# Patient Record
Sex: FEMALE | Race: WHITE | Employment: OTHER | URBAN - METROPOLITAN AREA
[De-identification: names, ages, dates, MRNs, and addresses within clinical notes are randomized per-mention and may not be internally consistent; named-entity substitution may affect disease eponyms.]

---

## 2017-01-03 ENCOUNTER — TELEPHONE (OUTPATIENT)
Dept: PHARMACY | Age: 67
End: 2017-01-03

## 2017-01-03 ENCOUNTER — OFFICE VISIT (OUTPATIENT)
Dept: ORTHOPEDIC SURGERY | Age: 67
End: 2017-01-03

## 2017-01-03 VITALS — HEIGHT: 66 IN | BODY MASS INDEX: 32.3 KG/M2 | WEIGHT: 201 LBS

## 2017-01-03 DIAGNOSIS — M75.111 INCOMPLETE TEAR OF RIGHT ROTATOR CUFF: Primary | ICD-10-CM

## 2017-01-03 PROCEDURE — 99213 OFFICE O/P EST LOW 20 MIN: CPT | Performed by: ORTHOPAEDIC SURGERY

## 2017-01-04 ENCOUNTER — TELEPHONE (OUTPATIENT)
Dept: CARDIOLOGY CLINIC | Age: 67
End: 2017-01-04

## 2017-01-05 ENCOUNTER — ANTI-COAG VISIT (OUTPATIENT)
Dept: PHARMACY | Age: 67
End: 2017-01-05

## 2017-01-05 DIAGNOSIS — I48.91 ATRIAL FIBRILLATION, UNSPECIFIED TYPE (HCC): ICD-10-CM

## 2017-01-05 LAB — INR BLD: 2.4

## 2017-01-08 ENCOUNTER — HOSPITAL ENCOUNTER (OUTPATIENT)
Dept: OTHER | Age: 67
Discharge: HOME | End: 2017-01-31
Attending: INTERNAL MEDICINE | Admitting: INTERNAL MEDICINE

## 2017-01-12 ENCOUNTER — HOSPITAL ENCOUNTER (OUTPATIENT)
Dept: SURGERY | Age: 67
Discharge: HOME | End: 2017-01-12
Attending: ORTHOPAEDIC SURGERY | Admitting: ORTHOPAEDIC SURGERY

## 2017-01-12 VITALS
HEIGHT: 67 IN | DIASTOLIC BLOOD PRESSURE: 87 MMHG | HEART RATE: 87 BPM | RESPIRATION RATE: 16 BRPM | WEIGHT: 193.5 LBS | OXYGEN SATURATION: 98 % | TEMPERATURE: 97.4 F | SYSTOLIC BLOOD PRESSURE: 140 MMHG | BODY MASS INDEX: 30.37 KG/M2

## 2017-01-12 LAB
APTT: 22.6 SEC (ref 25.2–36.4)
INR BLD: 1.01 (ref 0.85–1.16)
PROTHROMBIN TIME: 11.5 SEC (ref 9.8–13)

## 2017-01-12 RX ORDER — HYDROMORPHONE HCL 110MG/55ML
0.5 PATIENT CONTROLLED ANALGESIA SYRINGE INTRAVENOUS EVERY 5 MIN PRN
Status: DISCONTINUED | OUTPATIENT
Start: 2017-01-12 | End: 2017-01-13 | Stop reason: HOSPADM

## 2017-01-12 RX ORDER — SODIUM CHLORIDE 0.9 % (FLUSH) 0.9 %
10 SYRINGE (ML) INJECTION EVERY 12 HOURS SCHEDULED
Status: DISCONTINUED | OUTPATIENT
Start: 2017-01-12 | End: 2017-01-13 | Stop reason: HOSPADM

## 2017-01-12 RX ORDER — HYDROCODONE BITARTRATE AND ACETAMINOPHEN 5; 325 MG/1; MG/1
2 TABLET ORAL PRN
Status: ACTIVE | OUTPATIENT
Start: 2017-01-12 | End: 2017-01-12

## 2017-01-12 RX ORDER — FENTANYL CITRATE 50 UG/ML
25 INJECTION, SOLUTION INTRAMUSCULAR; INTRAVENOUS EVERY 5 MIN PRN
Status: DISCONTINUED | OUTPATIENT
Start: 2017-01-12 | End: 2017-01-13 | Stop reason: HOSPADM

## 2017-01-12 RX ORDER — OXYCODONE HYDROCHLORIDE AND ACETAMINOPHEN 5; 325 MG/1; MG/1
1-2 TABLET ORAL EVERY 4 HOURS PRN
Qty: 60 TABLET | Refills: 0 | Status: SHIPPED | OUTPATIENT
Start: 2017-01-12 | End: 2017-02-11

## 2017-01-12 RX ORDER — HYDROMORPHONE HCL 110MG/55ML
0.25 PATIENT CONTROLLED ANALGESIA SYRINGE INTRAVENOUS EVERY 5 MIN PRN
Status: DISCONTINUED | OUTPATIENT
Start: 2017-01-12 | End: 2017-01-13 | Stop reason: HOSPADM

## 2017-01-12 RX ORDER — SODIUM CHLORIDE, SODIUM LACTATE, POTASSIUM CHLORIDE, CALCIUM CHLORIDE 600; 310; 30; 20 MG/100ML; MG/100ML; MG/100ML; MG/100ML
INJECTION, SOLUTION INTRAVENOUS CONTINUOUS
Status: DISCONTINUED | OUTPATIENT
Start: 2017-01-12 | End: 2017-01-13 | Stop reason: HOSPADM

## 2017-01-12 RX ORDER — LIDOCAINE HYDROCHLORIDE 10 MG/ML
0.5 INJECTION, SOLUTION EPIDURAL; INFILTRATION; INTRACAUDAL; PERINEURAL ONCE
Status: DISCONTINUED | OUTPATIENT
Start: 2017-01-12 | End: 2017-01-13 | Stop reason: HOSPADM

## 2017-01-12 RX ORDER — HYDROCODONE BITARTRATE AND ACETAMINOPHEN 5; 325 MG/1; MG/1
1 TABLET ORAL PRN
Status: ACTIVE | OUTPATIENT
Start: 2017-01-12 | End: 2017-01-12

## 2017-01-12 RX ORDER — CLINDAMYCIN PHOSPHATE 900 MG/50ML
900 INJECTION INTRAVENOUS ONCE
Status: COMPLETED | OUTPATIENT
Start: 2017-01-12 | End: 2017-01-12

## 2017-01-12 RX ORDER — MEPERIDINE HYDROCHLORIDE 25 MG/ML
12.5 INJECTION INTRAMUSCULAR; INTRAVENOUS; SUBCUTANEOUS EVERY 5 MIN PRN
Status: DISCONTINUED | OUTPATIENT
Start: 2017-01-12 | End: 2017-01-13 | Stop reason: HOSPADM

## 2017-01-12 RX ORDER — LIDOCAINE HYDROCHLORIDE 10 MG/ML
1 INJECTION, SOLUTION EPIDURAL; INFILTRATION; INTRACAUDAL; PERINEURAL
Status: ACTIVE | OUTPATIENT
Start: 2017-01-12 | End: 2017-01-12

## 2017-01-12 RX ORDER — SODIUM CHLORIDE 0.9 % (FLUSH) 0.9 %
10 SYRINGE (ML) INJECTION PRN
Status: DISCONTINUED | OUTPATIENT
Start: 2017-01-12 | End: 2017-01-13 | Stop reason: HOSPADM

## 2017-01-12 RX ORDER — DIPHENHYDRAMINE HYDROCHLORIDE 50 MG/ML
12.5 INJECTION INTRAMUSCULAR; INTRAVENOUS
Status: ACTIVE | OUTPATIENT
Start: 2017-01-12 | End: 2017-01-12

## 2017-01-12 RX ORDER — CEFAZOLIN SODIUM 2 G/100ML
2 INJECTION, SOLUTION INTRAVENOUS
Status: COMPLETED | OUTPATIENT
Start: 2017-01-12 | End: 2017-01-12

## 2017-01-12 RX ORDER — PROMETHAZINE HYDROCHLORIDE 25 MG/ML
6.25 INJECTION, SOLUTION INTRAMUSCULAR; INTRAVENOUS EVERY 30 MIN PRN
Status: DISCONTINUED | OUTPATIENT
Start: 2017-01-12 | End: 2017-01-13 | Stop reason: HOSPADM

## 2017-01-12 RX ORDER — FENTANYL CITRATE 50 UG/ML
50 INJECTION, SOLUTION INTRAMUSCULAR; INTRAVENOUS EVERY 5 MIN PRN
Status: DISCONTINUED | OUTPATIENT
Start: 2017-01-12 | End: 2017-01-13 | Stop reason: HOSPADM

## 2017-01-12 RX ADMIN — CLINDAMYCIN PHOSPHATE 900 MG: 900 INJECTION INTRAVENOUS at 14:22

## 2017-01-12 RX ADMIN — CEFAZOLIN SODIUM 2 G: 2 INJECTION, SOLUTION INTRAVENOUS at 16:44

## 2017-01-12 ASSESSMENT — PAIN SCALES - GENERAL
PAINLEVEL_OUTOF10: 0

## 2017-01-12 ASSESSMENT — PAIN - FUNCTIONAL ASSESSMENT: PAIN_FUNCTIONAL_ASSESSMENT: 0-10

## 2017-01-13 DIAGNOSIS — M75.111 INCOMPLETE ROTATOR CUFF TEAR OR RUPTURE OF RIGHT SHOULDER, NOT SPECIFIED AS TRAUMATIC: Primary | ICD-10-CM

## 2017-01-13 DIAGNOSIS — M75.41 IMPINGEMENT SYNDROME, SHOULDER, RIGHT: ICD-10-CM

## 2017-01-13 DIAGNOSIS — M19.019 ARTHRITIS, SHOULDER REGION: ICD-10-CM

## 2017-01-17 ENCOUNTER — OFFICE VISIT (OUTPATIENT)
Dept: ORTHOPEDIC SURGERY | Age: 67
End: 2017-01-17

## 2017-01-17 DIAGNOSIS — M75.111 INCOMPLETE TEAR OF RIGHT ROTATOR CUFF: Primary | ICD-10-CM

## 2017-01-17 PROCEDURE — 99024 POSTOP FOLLOW-UP VISIT: CPT | Performed by: ORTHOPAEDIC SURGERY

## 2017-01-23 ENCOUNTER — ANTI-COAG VISIT (OUTPATIENT)
Dept: PHARMACY | Age: 67
End: 2017-01-23

## 2017-01-23 DIAGNOSIS — I48.91 ATRIAL FIBRILLATION, UNSPECIFIED TYPE (HCC): ICD-10-CM

## 2017-01-23 LAB — INR BLD: 2

## 2017-01-24 DIAGNOSIS — M25.521 RIGHT ELBOW PAIN: Primary | ICD-10-CM

## 2017-01-25 RX ORDER — METHYLPREDNISOLONE 4 MG/1
TABLET ORAL
Qty: 1 KIT | Refills: 0 | OUTPATIENT
Start: 2017-01-25 | End: 2017-01-30

## 2017-02-06 ENCOUNTER — ANTI-COAG VISIT (OUTPATIENT)
Dept: PHARMACY | Age: 67
End: 2017-02-06

## 2017-02-06 DIAGNOSIS — I48.91 ATRIAL FIBRILLATION, UNSPECIFIED TYPE (HCC): ICD-10-CM

## 2017-02-06 LAB — INR BLD: 3.2

## 2017-02-20 ENCOUNTER — ANTI-COAG VISIT (OUTPATIENT)
Dept: PHARMACY | Age: 67
End: 2017-02-20

## 2017-02-20 DIAGNOSIS — I48.91 ATRIAL FIBRILLATION, UNSPECIFIED TYPE (HCC): ICD-10-CM

## 2017-02-20 LAB — INR BLD: 2.6

## 2017-02-21 ENCOUNTER — OFFICE VISIT (OUTPATIENT)
Dept: ORTHOPEDIC SURGERY | Age: 67
End: 2017-02-21

## 2017-02-21 VITALS — HEIGHT: 67 IN | WEIGHT: 193 LBS | BODY MASS INDEX: 30.29 KG/M2

## 2017-02-21 DIAGNOSIS — M75.111 INCOMPLETE TEAR OF RIGHT ROTATOR CUFF: Primary | ICD-10-CM

## 2017-02-21 PROCEDURE — 99024 POSTOP FOLLOW-UP VISIT: CPT | Performed by: ORTHOPAEDIC SURGERY

## 2017-03-01 ENCOUNTER — HOSPITAL ENCOUNTER (OUTPATIENT)
Dept: OTHER | Age: 67
Discharge: HOME | End: 2017-03-31
Attending: ORTHOPAEDIC SURGERY | Admitting: ORTHOPAEDIC SURGERY

## 2017-03-20 ENCOUNTER — ANTI-COAG VISIT (OUTPATIENT)
Dept: PHARMACY | Age: 67
End: 2017-03-20

## 2017-03-20 DIAGNOSIS — I48.91 ATRIAL FIBRILLATION, UNSPECIFIED TYPE (HCC): ICD-10-CM

## 2017-03-20 LAB — INR BLD: 2.1

## 2017-04-04 ENCOUNTER — OFFICE VISIT (OUTPATIENT)
Dept: ORTHOPEDIC SURGERY | Age: 67
End: 2017-04-04

## 2017-04-04 VITALS — HEIGHT: 67 IN | WEIGHT: 193 LBS | BODY MASS INDEX: 30.29 KG/M2

## 2017-04-04 DIAGNOSIS — M75.111 INCOMPLETE TEAR OF RIGHT ROTATOR CUFF: Primary | ICD-10-CM

## 2017-04-04 PROCEDURE — 99024 POSTOP FOLLOW-UP VISIT: CPT | Performed by: ORTHOPAEDIC SURGERY

## 2017-04-17 ENCOUNTER — ANTI-COAG VISIT (OUTPATIENT)
Dept: PHARMACY | Age: 67
End: 2017-04-17

## 2017-04-17 DIAGNOSIS — I48.91 ATRIAL FIBRILLATION, UNSPECIFIED TYPE (HCC): ICD-10-CM

## 2017-04-17 LAB — INR BLD: 2.6

## 2017-05-26 ENCOUNTER — TELEPHONE (OUTPATIENT)
Dept: PHARMACY | Age: 67
End: 2017-05-26

## 2017-05-30 ENCOUNTER — ANTI-COAG VISIT (OUTPATIENT)
Dept: PHARMACY | Age: 67
End: 2017-05-30

## 2017-05-30 DIAGNOSIS — I48.91 ATRIAL FIBRILLATION, UNSPECIFIED TYPE (HCC): ICD-10-CM

## 2017-05-30 LAB — INR BLD: 1.7

## 2017-06-16 ENCOUNTER — OFFICE VISIT (OUTPATIENT)
Dept: CARDIOLOGY CLINIC | Age: 67
End: 2017-06-16

## 2017-06-16 VITALS
WEIGHT: 191.8 LBS | OXYGEN SATURATION: 92 % | HEIGHT: 67 IN | SYSTOLIC BLOOD PRESSURE: 130 MMHG | BODY MASS INDEX: 30.1 KG/M2 | HEART RATE: 86 BPM | DIASTOLIC BLOOD PRESSURE: 72 MMHG

## 2017-06-16 DIAGNOSIS — I10 ESSENTIAL HYPERTENSION: Chronic | ICD-10-CM

## 2017-06-16 DIAGNOSIS — I48.91 ATRIAL FIBRILLATION, UNSPECIFIED TYPE (HCC): Primary | ICD-10-CM

## 2017-06-16 DIAGNOSIS — E78.00 HYPERCHOLESTEROLEMIA: Chronic | ICD-10-CM

## 2017-06-16 DIAGNOSIS — I34.0 MITRAL VALVE INSUFFICIENCY, UNSPECIFIED ETIOLOGY: ICD-10-CM

## 2017-06-16 PROCEDURE — G8399 PT W/DXA RESULTS DOCUMENT: HCPCS | Performed by: INTERNAL MEDICINE

## 2017-06-16 PROCEDURE — 93000 ELECTROCARDIOGRAM COMPLETE: CPT | Performed by: INTERNAL MEDICINE

## 2017-06-16 PROCEDURE — 1090F PRES/ABSN URINE INCON ASSESS: CPT | Performed by: INTERNAL MEDICINE

## 2017-06-16 PROCEDURE — 3017F COLORECTAL CA SCREEN DOC REV: CPT | Performed by: INTERNAL MEDICINE

## 2017-06-16 PROCEDURE — 3014F SCREEN MAMMO DOC REV: CPT | Performed by: INTERNAL MEDICINE

## 2017-06-16 PROCEDURE — 4040F PNEUMOC VAC/ADMIN/RCVD: CPT | Performed by: INTERNAL MEDICINE

## 2017-06-16 PROCEDURE — 1123F ACP DISCUSS/DSCN MKR DOCD: CPT | Performed by: INTERNAL MEDICINE

## 2017-06-16 PROCEDURE — G8427 DOCREV CUR MEDS BY ELIG CLIN: HCPCS | Performed by: INTERNAL MEDICINE

## 2017-06-16 PROCEDURE — 99213 OFFICE O/P EST LOW 20 MIN: CPT | Performed by: INTERNAL MEDICINE

## 2017-06-16 PROCEDURE — 1036F TOBACCO NON-USER: CPT | Performed by: INTERNAL MEDICINE

## 2017-06-16 PROCEDURE — G8417 CALC BMI ABV UP PARAM F/U: HCPCS | Performed by: INTERNAL MEDICINE

## 2017-06-16 RX ORDER — CELECOXIB 100 MG/1
100 CAPSULE ORAL DAILY
COMMUNITY
End: 2017-12-21 | Stop reason: ALTCHOICE

## 2017-07-10 ENCOUNTER — ANTI-COAG VISIT (OUTPATIENT)
Dept: PHARMACY | Age: 67
End: 2017-07-10

## 2017-07-10 DIAGNOSIS — I48.91 ATRIAL FIBRILLATION, UNSPECIFIED TYPE (HCC): ICD-10-CM

## 2017-07-10 LAB — INR BLD: 2.6

## 2017-08-21 ENCOUNTER — ANTI-COAG VISIT (OUTPATIENT)
Dept: PHARMACY | Age: 67
End: 2017-08-21

## 2017-08-21 DIAGNOSIS — I48.91 ATRIAL FIBRILLATION, UNSPECIFIED TYPE (HCC): ICD-10-CM

## 2017-08-21 LAB — INR BLD: 2

## 2017-10-02 ENCOUNTER — ANTI-COAG VISIT (OUTPATIENT)
Dept: PHARMACY | Age: 67
End: 2017-10-02

## 2017-10-02 DIAGNOSIS — I48.91 ATRIAL FIBRILLATION, UNSPECIFIED TYPE (HCC): ICD-10-CM

## 2017-10-02 LAB
INR BLD: 1.9
PROTIME: 23 SECONDS

## 2017-10-02 NOTE — MR AVS SNAPSHOT
After Visit Summary             Bessie Schuster   10/2/2017 9:00 AM   Anti-coag visit    Description:  Female : 1950   Provider:  KIANA Jett [facility]   Department:  [address]              Your Follow-Up and Future Appointments         Below is a list of your follow-up and future appointments. This may not be a complete list as you may have made appointments directly with providers that we are not aware of or your providers may have made some for you. Please call your providers to confirm appointments. It is important to keep your appointments. Please bring your current insurance card, photo ID, co-pay, and all medication bottles to your appointment. If self-pay, payment is expected at the time of service. Your To-Do List     Future Appointments Provider Department Dept Phone    10/13/2017 7:30 AM Keyur Sheikh MD [facility] [phone]    Please arrive 15 minutes prior to appointment, bring photo ID and insurance card. 2017 9:00 AM [facility] [phone]         Information from Your Visit        Department     Name Address Phone Fax    [address] [address] [place] [address] [phone]      You Were Seen for:         Comments    Atrial fibrillation, unspecified type [facility])   [[id number]]         Anticoagulation Summary as of 10/2/2017              Today's INR 1. 9! Next INR check 2017      Description           Take 2 mg today then continue dose of 1mg on Mon & Fri and 2mg all other days. Vital Signs     Smoking Status                   Former Smoker           Additional Information about your Body Mass Index (BMI)           Your BMI as listed above is considered obese (30 or more). BMI is an estimate of body fat, calculated from your height and weight.   The higher your BMI, the greater your risk of heart disease, high blood pressure, type 2 diabetes, stroke, gallstones, arthritis, sleep apnea, and certain cancers. BMI is not perfect. It may overestimate body fat in athletes and people who are more muscular. Even a small weight loss (between 5 and 10 percent of your current weight) by decreasing your calorie intake and becoming more physically active will help lower your risk of developing or worsening diseases associated with obesity. Learn more at: [email or web address]             Today's Medication Changes          These changes are accurate as of: 10/2/17  9:11 AM.  If you have any questions, ask your nurse or doctor. CHANGE how you take these medications           * warfarin 2 MG tablet   Commonly known as:  COUMADIN   Instructions: Take 2 mg /day but on Sun and WED take 1 mg  Rechecked in 7-10 days   Quantity:  30 tablet   Refills:  3   What changed:    - how much to take  - when to take this  - additional instructions       * warfarin 1 MG tablet   Commonly known as:  COUMADIN   Instructions: Take 1 mg by mouth Twice a Week Take on mondays and fridays   Refills:  0   What changed:  Another medication with the same name was changed. Make sure you understand how and when to take each. * Notice: This list has 2 medication(s) that are the same as other medications prescribed for you. Read the directions carefully, and ask your doctor or other care provider to review them with you.             Your Current Medications Are              celecoxib (CELEBREX) 100 MG capsule Take 100 mg by mouth daily    lisinopril-hydrochlorothiazide (PRINZIDE;ZESTORETIC) 10-12.5 MG per tablet 1 tablet daily     warfarin (COUMADIN) 2 MG tablet Take 2 mg /day but on Sun and WED take 1 mg  Rechecked in 7-10 days    warfarin (COUMADIN) 1 MG tablet Take 1 mg by mouth Twice a Week Take on mondays and fridays levothyroxine (SYNTHROID) 125 MCG tablet Take 125 mcg by mouth Daily. pravastatin (PRAVACHOL) 40 MG tablet Take 40 mg by mouth daily. Cholecalciferol (VITAMIN D3) 2000 UNITS CAPS Take 1 capsule by mouth daily. Allergies              Pcn [Penicillins] Hives      We Ordered/Performed the following           Protime-INR     Comments: This external order was created through the results console. Result Summary for Protime-INR      Result Information     Status          Final result (Resulted: 10/2/2017  9:08 AM)           10/2/2017  9:09 AM      Component Results     Component Value Ref Range & Units Status    INR 1.9  Final    Protime 23 seconds Final               Additional Information        Basic Information     Date Of Birth Sex Race Ethnicity Preferred Language    1950 Female White Non-/Non  English      Problem List as of 10/2/2017  Date Reviewed: 6/16/2017                Mitral regurgitation    Incomplete tear of right rotator cuff    Shoulder impingement    Diverticulitis of colon    Essential hypertension (Chronic)    Hypercholesterolemia (Chronic)    Atrial fibrillation (HCC)      Preventive Care        Date Due    Hepatitis C screening is recommended for all adults regardless of risk factors born between [facility] at least once (lifetime) who have never been tested. 1950    Tetanus Combination Vaccine (1 - Tdap) 3/15/1967    Colonoscopy 3/15/1998    Zoster Vaccine 3/15/2008    Pneumococcal Vaccines (two) for all adults aged 72 and over (1 of 2 - PCV13) 3/15/2013    Cholesterol Screening 12/12/2014    Yearly Flu Vaccine (1) 9/1/2017    Mammograms are recommended every 2 years for low/average risk patients aged 48 - 69, and every year for high risk patients per updated national guidelines. However these guidelines can be individualized by your provider.  11/22/2018            Rigoberto Signup 13      1 mg         14      2 mg           15      2 mg         16      1 mg         17      2 mg         18      2 mg         19      2 mg         20      1 mg         21      2 mg           22      2 mg         23      1 mg         24      2 mg         25      2 mg         26      2 mg         27      1 mg         28      2 mg           29      2 mg         30      1 mg         31      2 mg              Date Details   10/02 This INR check               How to take your warfarin dose              To take:  1 mg Take 0.5 of a 2 mg tablet. To take:  2 mg Take 1 of the 2 mg tablets. November 2017 Details    Sun Mon Tue Wed Thu Fri Sat        1      2 mg         2      2 mg         3      1 mg         4      2 mg           5      2 mg         6      1 mg         7      2 mg         8      2 mg         9      2 mg         10      1 mg         11      2 mg           12      2 mg         13      1 mg         14               15               16               17               18                 19               20               21               22               23               24               25                 26               32               28               29               30                  Date Details   No additional details    Date of next INR:  11/13/2017         How to take your warfarin dose              To take:  1 mg Take 0.5 of a 2 mg tablet. To take:  2 mg Take 1 of the 2 mg tablets.

## 2017-10-13 ENCOUNTER — OFFICE VISIT (OUTPATIENT)
Dept: ORTHOPEDIC SURGERY | Age: 67
End: 2017-10-13

## 2017-10-13 VITALS — BODY MASS INDEX: 29.98 KG/M2 | HEIGHT: 67 IN | WEIGHT: 191 LBS

## 2017-10-13 DIAGNOSIS — M75.111 INCOMPLETE TEAR OF RIGHT ROTATOR CUFF: Primary | ICD-10-CM

## 2017-10-13 PROCEDURE — 1123F ACP DISCUSS/DSCN MKR DOCD: CPT | Performed by: ORTHOPAEDIC SURGERY

## 2017-10-13 PROCEDURE — 1090F PRES/ABSN URINE INCON ASSESS: CPT | Performed by: ORTHOPAEDIC SURGERY

## 2017-10-13 PROCEDURE — 3017F COLORECTAL CA SCREEN DOC REV: CPT | Performed by: ORTHOPAEDIC SURGERY

## 2017-10-13 PROCEDURE — G8427 DOCREV CUR MEDS BY ELIG CLIN: HCPCS | Performed by: ORTHOPAEDIC SURGERY

## 2017-10-13 PROCEDURE — 1036F TOBACCO NON-USER: CPT | Performed by: ORTHOPAEDIC SURGERY

## 2017-10-13 PROCEDURE — 3014F SCREEN MAMMO DOC REV: CPT | Performed by: ORTHOPAEDIC SURGERY

## 2017-10-13 PROCEDURE — G8484 FLU IMMUNIZE NO ADMIN: HCPCS | Performed by: ORTHOPAEDIC SURGERY

## 2017-10-13 PROCEDURE — 99213 OFFICE O/P EST LOW 20 MIN: CPT | Performed by: ORTHOPAEDIC SURGERY

## 2017-10-13 PROCEDURE — G8417 CALC BMI ABV UP PARAM F/U: HCPCS | Performed by: ORTHOPAEDIC SURGERY

## 2017-10-13 PROCEDURE — G8399 PT W/DXA RESULTS DOCUMENT: HCPCS | Performed by: ORTHOPAEDIC SURGERY

## 2017-10-13 PROCEDURE — 4040F PNEUMOC VAC/ADMIN/RCVD: CPT | Performed by: ORTHOPAEDIC SURGERY

## 2017-10-13 NOTE — PROGRESS NOTES
normal.        Assessment :  Right shoulder status post arthroscopic rotator cuff repair and subacromial decompression    Impression:  Encounter Diagnosis   Name Primary?  Incomplete tear of right rotator cuff Yes       Office Procedures:  No orders of the defined types were placed in this encounter. Treatment Plan:  She is doing well at this time. She does have a home therapy program and is going to continue to work range of motion especially internal rotation. I will see her back in clinic as needed in the future.

## 2017-10-30 ENCOUNTER — HOSPITAL ENCOUNTER (OUTPATIENT)
Dept: OTHER | Age: 67
Discharge: HOME | End: 2017-10-30
Attending: INTERNAL MEDICINE | Admitting: INTERNAL MEDICINE

## 2017-10-30 LAB
BASOPHILS ABSOLUTE: 0 K/UL (ref 0–0.2)
BASOPHILS RELATIVE PERCENT: 1 %
CHOLESTEROL, TOTAL: 182 MG/DL (ref 0–199)
EOSINOPHILS ABSOLUTE: 0.1 K/UL (ref 0–0.6)
EOSINOPHILS RELATIVE PERCENT: 2.6 %
HCT VFR BLD CALC: 46.6 % (ref 36–48)
HDLC SERPL-MCNC: 66 MG/DL (ref 40–60)
HEMOGLOBIN: 15.5 G/DL (ref 12–16)
LDL CHOLESTEROL CALCULATED: 100 MG/DL
LYMPHOCYTES ABSOLUTE: 1.8 K/UL (ref 1–5.1)
LYMPHOCYTES RELATIVE PERCENT: 50.7 %
MCH RBC QN AUTO: 30.3 PG (ref 26–34)
MCHC RBC AUTO-ENTMCNC: 33.4 G/DL (ref 31–36)
MCV RBC AUTO: 90.9 FL (ref 80–100)
MONOCYTES ABSOLUTE: 0.3 K/UL (ref 0–1.3)
MONOCYTES RELATIVE PERCENT: 7.5 %
NEUTROPHILS ABSOLUTE: 1.3 K/UL (ref 1.7–7.7)
NEUTROPHILS RELATIVE PERCENT: 38.2 %
PDW BLD-RTO: 13.6 % (ref 12.4–15.4)
PLATELET # BLD: 184 K/UL (ref 135–450)
PMV BLD AUTO: 10.6 FL (ref 5–10.5)
RBC # BLD: 5.13 M/UL (ref 4–5.2)
TRIGL SERPL-MCNC: 81 MG/DL (ref 0–150)
VLDLC SERPL CALC-MCNC: 16 MG/DL
WBC # BLD: 3.5 K/UL (ref 4–11)

## 2017-11-01 ENCOUNTER — HOSPITAL ENCOUNTER (OUTPATIENT)
Dept: OTHER | Age: 67
Discharge: HOME | End: 2017-11-30
Attending: INTERNAL MEDICINE | Admitting: INTERNAL MEDICINE

## 2017-11-06 NOTE — TELEPHONE ENCOUNTER
Warfarin prescription phoned into [facility] to [address] under [address]. Jf Sauceda  Warfarin 2 mg tabs  Take 1mg on Mon & Fri and 2mg all other days  90 days   2 refills

## 2017-11-13 ENCOUNTER — ANTI-COAG VISIT (OUTPATIENT)
Dept: PHARMACY | Age: 67
End: 2017-11-13

## 2017-11-13 DIAGNOSIS — I48.91 ATRIAL FIBRILLATION, UNSPECIFIED TYPE (HCC): ICD-10-CM

## 2017-11-13 LAB
INR BLD: 2.8
PROTIME: 33.5 SECONDS

## 2017-11-13 NOTE — PROGRESS NOTES
Ms. Vanessa Rivera is a 69 y.o. y/o female with history of Afib Diagnosed 4/10/14. Started warfarin 4/11/15 who presents today for anticoagulation monitoring and adjustment. Pertinent PMH:Had a CV 7/10/15 but is back in a-fib. Patient Reported Findings:  Yes     No  [x]   []       Patient verifies current dosing regimen as listed  []   [x]       S/S bleeding/bruising/swelling/SOB  []   [x]       Blood in urine or stool  []   [x]       Procedures scheduled in the future at this time  []   [x]       Missed Dose  []   [x]       Extra Dose  []   [x]       Change in medications  []   [x]       Change in health/diet/appetite  []   [x]       Change in alcohol use  []   [x]       Change in activity  []   [x]       Hospital admission  []   [x]       Emergency department visit  []   [x]       Other complaints    Clinical Outcomes:  Yes     No  []   [x]       Major bleeding event  []   [x]       Thromboembolic event    Duration of warfarin Therapy: indefinitely  INR Range:  2.0-3.0    INR is 2.8 today. Continue current dose of warfarin 1mg on Mon and Fri and 2mg all other days.   Recheck INR in 5 weeks; 12/21    Referring PCP is Dr. Sindy Hdez  INR (no units)   Date Value   11/13/2017 2.8   10/02/2017 1.9   08/21/2017 2   07/10/2017 2.6

## 2017-12-01 ENCOUNTER — HOSPITAL ENCOUNTER (OUTPATIENT)
Dept: OTHER | Age: 67
Discharge: HOME | End: 2017-12-31
Attending: INTERNAL MEDICINE | Admitting: INTERNAL MEDICINE

## 2017-12-21 ENCOUNTER — ANTI-COAG VISIT (OUTPATIENT)
Dept: PHARMACY | Age: 67
End: 2017-12-21

## 2017-12-21 DIAGNOSIS — I48.91 ATRIAL FIBRILLATION, UNSPECIFIED TYPE (HCC): ICD-10-CM

## 2017-12-21 LAB
INR BLD: 2.5
PROTIME: 30.1 SECONDS

## 2017-12-21 NOTE — PROGRESS NOTES
Ms. Nora Soto is a 69 y.o. y/o female with history of Afib Diagnosed 4/10/14. Started warfarin 4/11/15   She presents today for anticoagulation monitoring and adjustment. Pertinent PMH:Had a CV 7/10/15 but is back in a-fib. Patient Reported Findings:  Yes     No  [x]   []       Patient verifies current dosing regimen as listed  []   [x]       S/S bleeding/bruising/swelling/SOB  []   [x]       Blood in urine or stool  []   [x]       Procedures scheduled in the future at this time  []   [x]       Missed Dose  []   [x]       Extra Dose  []   [x]       Change in medications  []   [x]       Change in health/diet/appetite  []   [x]       Change in alcohol use  []   [x]       Change in activity  []   [x]       Hospital admission  []   [x]       Emergency department visit  []   [x]       Other complaints    Clinical Outcomes:  Yes     No  []   [x]       Major bleeding event  []   [x]       Thromboembolic event    Duration of warfarin Therapy: indefinitely  INR Range:  2.0-3.0    INR is 2.5 today. Continue same weekly dose of warfarin 1mg on Mon & Fri and 2mg all other days. Recheck INR in 6 weeks.     Referring PCP is Dr. Bianka Urias  INR (no units)   Date Value   12/21/2017 2.5   11/13/2017 2.8   10/02/2017 1.9   08/21/2017 2

## 2018-01-01 ENCOUNTER — HOSPITAL ENCOUNTER (OUTPATIENT)
Dept: OTHER | Age: 68
Discharge: HOME | End: 2018-01-31
Attending: INTERNAL MEDICINE | Admitting: INTERNAL MEDICINE

## 2018-02-01 ENCOUNTER — HOSPITAL ENCOUNTER (OUTPATIENT)
Dept: OTHER | Age: 68
Discharge: HOME | End: 2018-02-28
Attending: INTERNAL MEDICINE | Admitting: INTERNAL MEDICINE

## 2018-02-05 ENCOUNTER — ANTI-COAG VISIT (OUTPATIENT)
Dept: PHARMACY | Age: 68
End: 2018-02-05

## 2018-02-05 DIAGNOSIS — I48.91 ATRIAL FIBRILLATION, UNSPECIFIED TYPE (HCC): ICD-10-CM

## 2018-02-05 LAB
INR BLD: 2.4
PROTIME: 28.6 SECONDS

## 2018-02-05 NOTE — PROGRESS NOTES
Ms. Courtney Corbett is a 69 y.o. y/o female with history of Afib Diagnosed 4/10/14. Started warfarin 4/11/15   She presents today for anticoagulation monitoring and adjustment. Pertinent PMH:Had a CV 7/10/15 but is back in a-fib. Patient Reported Findings:  Yes     No  [x]   []       Patient verifies current dosing regimen as listed  []   [x]       S/S bleeding/bruising/swelling/SOB  []   [x]       Blood in urine or stool  []   [x]       Procedures scheduled in the future at this time  []   [x]       Missed Dose  []   [x]       Extra Dose  []   [x]       Change in medications  []   [x]       Change in health/diet/appetite  []   [x]       Change in alcohol use  []   [x]       Change in activity  []   [x]       Hospital admission  []   [x]       Emergency department visit  []   [x]       Other complaints    Clinical Outcomes:  Yes     No  []   [x]       Major bleeding event  []   [x]       Thromboembolic event    Duration of warfarin Therapy: indefinitely  INR Range:  2.0-3.0    INR is 2.4 today. Continue same weekly dose of warfarin 1mg on Mon & Fri and 2mg all other days. Encouraged to maintain a consistency of vegetables/salads. Recheck INR in 6 weeks.     Referring PCP is Dr. Juani Carrillo  INR (no units)   Date Value   02/05/2018 2.4   12/21/2017 2.5   11/13/2017 2.8   10/02/2017 1.9

## 2018-03-01 ENCOUNTER — HOSPITAL ENCOUNTER (OUTPATIENT)
Dept: OTHER | Age: 68
Discharge: HOME | End: 2018-03-31
Attending: INTERNAL MEDICINE | Admitting: INTERNAL MEDICINE

## 2018-03-20 ENCOUNTER — ANTI-COAG VISIT (OUTPATIENT)
Dept: PHARMACY | Age: 68
End: 2018-03-20

## 2018-03-20 DIAGNOSIS — I48.91 ATRIAL FIBRILLATION, UNSPECIFIED TYPE (HCC): ICD-10-CM

## 2018-03-20 LAB
INR BLD: 3.6
PROTIME: 43.4 SECONDS

## 2018-03-20 NOTE — PROGRESS NOTES
Ms. Miguel Hernandez is a 77 y.o. y/o female with history of Afib Diagnosed 4/10/14. Started warfarin 4/11/15   She presents today for anticoagulation monitoring and adjustment. Pertinent PMH:Had a CV 7/10/15 but is back in a-fib. Patient Reported Findings:  Yes     No  [x]   []       Patient verifies current dosing regimen as listed  []   [x]       S/S bleeding/bruising/swelling/SOB  []   [x]       Blood in urine or stool  []   [x]       Procedures scheduled in the future at this time  []   [x]       Missed Dose  []   [x]       Extra Dose  []   [x]       Change in medications  [x]   []       Change in health/diet/appetite she has not had any salads for the last 10 days and very few if any vegetables since he house is being painted and new gi. All rooms are disheveled and dinners have been off and something quick. []   [x]       Change in alcohol use  []   [x]       Change in activity  []   [x]       Hospital admission  []   [x]       Emergency department visit  []   [x]       Other complaints    Clinical Outcomes:  Yes     No  []   [x]       Major bleeding event  []   [x]       Thromboembolic event    Duration of warfarin Therapy: indefinitely  INR Range:  2.0-3.0    INR is 3.6 today. D/t no salads and few if any vegetables for the past week - 10 days. She is otherwise very conistent with therapeutic INR's  Take 1mg tonight only then continue same weekly dose of warfarin 1mg on Mon & Fri and 2mg all other days. Encouraged to maintain a consistency of vegetables/salads. Recheck INR in 6 weeks.     Referring PCP is Dr. Sergey Reyes  INR (no units)   Date Value   03/20/2018 3.6   02/05/2018 2.4   12/21/2017 2.5   11/13/2017 2.8

## 2018-04-01 ENCOUNTER — HOSPITAL ENCOUNTER (OUTPATIENT)
Dept: OTHER | Age: 68
Discharge: HOME | End: 2018-04-30
Attending: INTERNAL MEDICINE | Admitting: INTERNAL MEDICINE

## 2018-04-26 ENCOUNTER — TELEPHONE (OUTPATIENT)
Dept: PHARMACY | Age: 68
End: 2018-04-26

## 2018-05-01 ENCOUNTER — ANTI-COAG VISIT (OUTPATIENT)
Dept: PHARMACY | Age: 68
End: 2018-05-01

## 2018-05-01 ENCOUNTER — HOSPITAL ENCOUNTER (OUTPATIENT)
Dept: OTHER | Age: 68
Discharge: HOME | End: 2018-05-31
Attending: INTERNAL MEDICINE | Admitting: INTERNAL MEDICINE

## 2018-05-01 DIAGNOSIS — I48.91 ATRIAL FIBRILLATION, UNSPECIFIED TYPE (HCC): ICD-10-CM

## 2018-05-01 LAB
INR BLD: 1.9
PROTIME: 23 SECONDS

## 2018-06-01 ENCOUNTER — HOSPITAL ENCOUNTER (OUTPATIENT)
Dept: OTHER | Age: 68
Discharge: HOME | End: 2018-06-30
Attending: INTERNAL MEDICINE | Admitting: INTERNAL MEDICINE

## 2018-06-11 ENCOUNTER — ANTI-COAG VISIT (OUTPATIENT)
Dept: PHARMACY | Age: 68
End: 2018-06-11

## 2018-06-11 DIAGNOSIS — I48.91 ATRIAL FIBRILLATION, UNSPECIFIED TYPE (HCC): ICD-10-CM

## 2018-06-11 LAB
INR BLD: 2.9
PROTIME: 35.3 SECONDS

## 2018-07-01 ENCOUNTER — HOSPITAL ENCOUNTER (OUTPATIENT)
Dept: OTHER | Age: 68
Discharge: HOME | End: 2018-07-31
Attending: INTERNAL MEDICINE | Admitting: INTERNAL MEDICINE

## 2018-07-19 NOTE — TELEPHONE ENCOUNTER
Warfarin prescription phoned into [facility] to [address] [facility] under Dr. Nancy Thompson  Warfarin 2 mg tabs  Take 1mg on Mon & Fri and 2mg all other days  90 days   2 refills  '

## 2018-07-23 ENCOUNTER — ANTI-COAG VISIT (OUTPATIENT)
Dept: PHARMACY | Age: 68
End: 2018-07-23

## 2018-07-23 DIAGNOSIS — I48.91 ATRIAL FIBRILLATION, UNSPECIFIED TYPE (HCC): ICD-10-CM

## 2018-07-23 LAB
INR BLD: 2.5
PROTIME: 29.7 SECONDS

## 2018-08-01 ENCOUNTER — HOSPITAL ENCOUNTER (OUTPATIENT)
Dept: OTHER | Age: 68
Discharge: HOME | End: 2018-08-31
Attending: INTERNAL MEDICINE | Admitting: INTERNAL MEDICINE

## 2018-08-30 ENCOUNTER — HOSPITAL ENCOUNTER (OUTPATIENT)
Dept: GENERAL RADIOLOGY | Age: 68
Discharge: HOME | End: 2018-08-30
Attending: INTERNAL MEDICINE | Admitting: INTERNAL MEDICINE

## 2018-08-30 DIAGNOSIS — M81.0 AGE-RELATED OSTEOPOROSIS WITHOUT CURRENT PATHOLOGICAL FRACTURE: ICD-10-CM

## 2018-09-01 ENCOUNTER — HOSPITAL ENCOUNTER (OUTPATIENT)
Dept: OTHER | Age: 68
Discharge: HOME | End: 2018-09-01
Attending: INTERNAL MEDICINE | Admitting: INTERNAL MEDICINE

## 2018-09-05 ENCOUNTER — ANTI-COAG VISIT (OUTPATIENT)
Dept: PHARMACY | Age: 68
End: 2018-09-05

## 2018-09-05 DIAGNOSIS — I48.91 ATRIAL FIBRILLATION, UNSPECIFIED TYPE (HCC): ICD-10-CM

## 2018-09-05 LAB
INR BLD: 3.4
PROTIME: 41 SECONDS

## 2018-09-05 NOTE — PROGRESS NOTES
Ms. Michael Hooper is a 77 y.o. y/o female with history of Afib Diagnosed 4/10/14. Started warfarin 4/11/15   She presents today for anticoagulation monitoring and adjustment. Pertinent PMH:Had a CV 7/10/15 but is back in a-fib. Patient Reported Findings:  Yes     No  [x]   []       Patient verifies current dosing regimen as listed  []   [x]       S/S bleeding/bruising/swelling/SOB  []   [x]       Blood in urine or stool  []   [x]       Procedures scheduled in the future at this time  []   [x]       Missed dose   []   [x]       Extra dose  [x]   []       Change in medications Finishing 7 day therapy of Cipro 500mg twice a day last dose will be in 2 days. [x]   []       Change in health/diet/appetite Light on vegetables/salads d/t gastroenteritis. Finishing ABX. [x]       Change in alcohol use  []   [x]       Change in activity  []   [x]       Hospital admission  []   [x]       Emergency department visit  []   [x]       Other complaints  Clinical Outcomes:  Yes     No  []   [x]       Major bleeding event  []   [x]       Thromboembolic event    Duration of warfarin Therapy: indefinitely  INR Range:  2.0-3.0    INR is 3.4 today. Take 1mg today only then continue same weekly dose of warfarin 1mg on Mon & Fri and 2mg all other days. Will return to normal diet working back as of today  Encouraged to maintain a consistency of vegetables/salads. Patient has otherwise been very stable with the warfarin dose and INR. Recheck INR in 6 weeks.     Referring PCP is Dr. Susannah Ortiz  INR (no units)   Date Value   09/05/2018 3.4   07/23/2018 2.5   06/11/2018 2.9   05/01/2018 1.9

## 2018-10-08 ENCOUNTER — ANTI-COAG VISIT (OUTPATIENT)
Dept: PHARMACY | Age: 68
End: 2018-10-08
Payer: MEDICARE

## 2018-10-08 DIAGNOSIS — I48.91 ATRIAL FIBRILLATION, UNSPECIFIED TYPE (HCC): ICD-10-CM

## 2018-10-08 LAB — INTERNATIONAL NORMALIZATION RATIO, POC: 2.3

## 2018-10-08 PROCEDURE — 99211 OFF/OP EST MAY X REQ PHY/QHP: CPT

## 2018-10-08 PROCEDURE — 85610 PROTHROMBIN TIME: CPT

## 2018-11-19 ENCOUNTER — ANTI-COAG VISIT (OUTPATIENT)
Dept: PHARMACY | Age: 68
End: 2018-11-19
Payer: MEDICARE

## 2018-11-19 DIAGNOSIS — I48.91 ATRIAL FIBRILLATION, UNSPECIFIED TYPE (HCC): ICD-10-CM

## 2018-11-19 LAB — INTERNATIONAL NORMALIZATION RATIO, POC: 2.6

## 2018-11-19 PROCEDURE — 85610 PROTHROMBIN TIME: CPT

## 2018-11-19 PROCEDURE — 99211 OFF/OP EST MAY X REQ PHY/QHP: CPT

## 2018-12-24 ENCOUNTER — HOSPITAL ENCOUNTER (OUTPATIENT)
Dept: GENERAL RADIOLOGY | Age: 68
Discharge: HOME | End: 2018-12-24
Payer: MEDICARE

## 2018-12-24 ENCOUNTER — HOSPITAL ENCOUNTER (OUTPATIENT)
Age: 68
Discharge: HOME | End: 2018-12-24
Payer: MEDICARE

## 2018-12-24 DIAGNOSIS — R52 PAIN: ICD-10-CM

## 2018-12-24 PROCEDURE — 73560 X-RAY EXAM OF KNEE 1 OR 2: CPT

## 2018-12-31 ENCOUNTER — ANTI-COAG VISIT (OUTPATIENT)
Dept: PHARMACY | Age: 68
End: 2018-12-31
Payer: MEDICARE

## 2018-12-31 DIAGNOSIS — I48.91 ATRIAL FIBRILLATION, UNSPECIFIED TYPE (HCC): ICD-10-CM

## 2018-12-31 LAB — INTERNATIONAL NORMALIZATION RATIO, POC: 2.9

## 2018-12-31 PROCEDURE — 85610 PROTHROMBIN TIME: CPT

## 2018-12-31 PROCEDURE — 99211 OFF/OP EST MAY X REQ PHY/QHP: CPT

## 2018-12-31 NOTE — PROGRESS NOTES
Ms. Andrews Smith is a 77 y.o. y/o female with history of Afib Diagnosed 4/10/14. Started warfarin 4/11/15   She presents today for anticoagulation monitoring and adjustment. Pertinent PMH:Had a CV 7/10/15 but is back in a-fib. Patient Reported Findings:  Yes     No  [x]   []       Patient verifies current dosing regimen as listed  []   [x]       S/S bleeding/bruising/swelling/SOB  []   [x]       Blood in urine or stool  []   [x]       Procedures scheduled in the future at this time  []   [x]       Missed dose   []   [x]       Extra dose  []   [x]       Change in medications  [x]   [x]       Change in health/diet/appetite states that she feels like she has not had a lot of greens recently   []   [x]       Change in alcohol use  []   [x]       Change in activity  []   [x]       Hospital admission  []   [x]       Emergency department visit  []   [x]       Other complaints  Clinical Outcomes:  Yes     No  []   [x]       Major bleeding event  []   [x]       Thromboembolic event    Duration of warfarin Therapy: indefinitely  INR Range:  2.0-3.0    INR is 2.9 today. Continue weekly dose of warfarin 1mg on Mon & Fri and 2mg all other days. Encouraged to maintain a consistency of vegetables/salads.   Recheck INR in 6 weeks 2/11    Referring PCP is Dr. Yessica Bonilla  INR (no units)   Date Value   12/31/2018 2.9   11/19/2018 2.6   10/08/2018 2.3   09/05/2018 3.4   07/23/2018 2.5   06/11/2018 2.9   05/01/2018 1.9

## 2019-01-29 ENCOUNTER — OFFICE VISIT (OUTPATIENT)
Dept: ORTHOPEDIC SURGERY | Age: 69
End: 2019-01-29
Payer: MEDICARE

## 2019-01-29 VITALS — HEIGHT: 67 IN | BODY MASS INDEX: 27.15 KG/M2 | WEIGHT: 173 LBS

## 2019-01-29 DIAGNOSIS — S86.891A RIGHT MEDIAL TIBIAL STRESS SYNDROME, INITIAL ENCOUNTER: ICD-10-CM

## 2019-01-29 DIAGNOSIS — M25.561 RIGHT KNEE PAIN, UNSPECIFIED CHRONICITY: Primary | ICD-10-CM

## 2019-01-29 PROCEDURE — 3017F COLORECTAL CA SCREEN DOC REV: CPT | Performed by: ORTHOPAEDIC SURGERY

## 2019-01-29 PROCEDURE — 99213 OFFICE O/P EST LOW 20 MIN: CPT | Performed by: ORTHOPAEDIC SURGERY

## 2019-01-29 PROCEDURE — G8427 DOCREV CUR MEDS BY ELIG CLIN: HCPCS | Performed by: ORTHOPAEDIC SURGERY

## 2019-01-29 PROCEDURE — 1090F PRES/ABSN URINE INCON ASSESS: CPT | Performed by: ORTHOPAEDIC SURGERY

## 2019-01-29 PROCEDURE — G8419 CALC BMI OUT NRM PARAM NOF/U: HCPCS | Performed by: ORTHOPAEDIC SURGERY

## 2019-01-29 PROCEDURE — 1101F PT FALLS ASSESS-DOCD LE1/YR: CPT | Performed by: ORTHOPAEDIC SURGERY

## 2019-01-29 PROCEDURE — G8484 FLU IMMUNIZE NO ADMIN: HCPCS | Performed by: ORTHOPAEDIC SURGERY

## 2019-02-06 ENCOUNTER — ANTI-COAG VISIT (OUTPATIENT)
Dept: PHARMACY | Age: 69
End: 2019-02-06
Payer: MEDICARE

## 2019-02-06 ENCOUNTER — HOSPITAL ENCOUNTER (OUTPATIENT)
Dept: MRI IMAGING | Age: 69
Discharge: HOME | End: 2019-02-06
Payer: MEDICARE

## 2019-02-06 DIAGNOSIS — S86.891A RIGHT MEDIAL TIBIAL STRESS SYNDROME, INITIAL ENCOUNTER: ICD-10-CM

## 2019-02-06 DIAGNOSIS — I48.91 ATRIAL FIBRILLATION, UNSPECIFIED TYPE (HCC): ICD-10-CM

## 2019-02-06 LAB — INTERNATIONAL NORMALIZATION RATIO, POC: 2.4

## 2019-02-06 PROCEDURE — 73718 MRI LOWER EXTREMITY W/O DYE: CPT

## 2019-02-06 PROCEDURE — 99211 OFF/OP EST MAY X REQ PHY/QHP: CPT

## 2019-02-06 PROCEDURE — 85610 PROTHROMBIN TIME: CPT

## 2019-02-15 ENCOUNTER — OFFICE VISIT (OUTPATIENT)
Dept: ORTHOPEDIC SURGERY | Age: 69
End: 2019-02-15
Payer: MEDICARE

## 2019-02-15 VITALS — BODY MASS INDEX: 27.16 KG/M2 | HEIGHT: 67 IN | WEIGHT: 173.06 LBS

## 2019-02-15 DIAGNOSIS — S80.01XD CONTUSION OF RIGHT KNEE, SUBSEQUENT ENCOUNTER: Primary | ICD-10-CM

## 2019-02-15 PROCEDURE — 1036F TOBACCO NON-USER: CPT | Performed by: ORTHOPAEDIC SURGERY

## 2019-02-15 PROCEDURE — G8427 DOCREV CUR MEDS BY ELIG CLIN: HCPCS | Performed by: ORTHOPAEDIC SURGERY

## 2019-02-15 PROCEDURE — 3017F COLORECTAL CA SCREEN DOC REV: CPT | Performed by: ORTHOPAEDIC SURGERY

## 2019-02-15 PROCEDURE — 4040F PNEUMOC VAC/ADMIN/RCVD: CPT | Performed by: ORTHOPAEDIC SURGERY

## 2019-02-15 PROCEDURE — 1090F PRES/ABSN URINE INCON ASSESS: CPT | Performed by: ORTHOPAEDIC SURGERY

## 2019-02-15 PROCEDURE — G8419 CALC BMI OUT NRM PARAM NOF/U: HCPCS | Performed by: ORTHOPAEDIC SURGERY

## 2019-02-15 PROCEDURE — 1101F PT FALLS ASSESS-DOCD LE1/YR: CPT | Performed by: ORTHOPAEDIC SURGERY

## 2019-02-15 PROCEDURE — G8399 PT W/DXA RESULTS DOCUMENT: HCPCS | Performed by: ORTHOPAEDIC SURGERY

## 2019-02-15 PROCEDURE — 1123F ACP DISCUSS/DSCN MKR DOCD: CPT | Performed by: ORTHOPAEDIC SURGERY

## 2019-02-15 PROCEDURE — G8484 FLU IMMUNIZE NO ADMIN: HCPCS | Performed by: ORTHOPAEDIC SURGERY

## 2019-02-15 PROCEDURE — 99213 OFFICE O/P EST LOW 20 MIN: CPT | Performed by: ORTHOPAEDIC SURGERY

## 2019-03-14 ENCOUNTER — TELEPHONE (OUTPATIENT)
Dept: CARDIOLOGY CLINIC | Age: 69
End: 2019-03-14

## 2019-03-14 DIAGNOSIS — I34.0 MITRAL VALVE INSUFFICIENCY, UNSPECIFIED ETIOLOGY: Primary | ICD-10-CM

## 2019-03-14 DIAGNOSIS — I48.91 ATRIAL FIBRILLATION, UNSPECIFIED TYPE (HCC): ICD-10-CM

## 2019-03-18 ENCOUNTER — ANTI-COAG VISIT (OUTPATIENT)
Dept: PHARMACY | Age: 69
End: 2019-03-18
Payer: MEDICARE

## 2019-03-18 DIAGNOSIS — I48.91 ATRIAL FIBRILLATION, UNSPECIFIED TYPE (HCC): ICD-10-CM

## 2019-03-18 LAB — INTERNATIONAL NORMALIZATION RATIO, POC: 2.5

## 2019-03-18 PROCEDURE — 85610 PROTHROMBIN TIME: CPT

## 2019-03-18 PROCEDURE — 99211 OFF/OP EST MAY X REQ PHY/QHP: CPT

## 2019-04-29 ENCOUNTER — ANTI-COAG VISIT (OUTPATIENT)
Dept: PHARMACY | Age: 69
End: 2019-04-29
Payer: MEDICARE

## 2019-04-29 DIAGNOSIS — I48.91 ATRIAL FIBRILLATION, UNSPECIFIED TYPE (HCC): ICD-10-CM

## 2019-04-29 LAB — INTERNATIONAL NORMALIZATION RATIO, POC: 2.4

## 2019-04-29 PROCEDURE — 99211 OFF/OP EST MAY X REQ PHY/QHP: CPT

## 2019-04-29 PROCEDURE — 85610 PROTHROMBIN TIME: CPT

## 2019-04-29 RX ORDER — ACETAMINOPHEN 160 MG
1 TABLET,DISINTEGRATING ORAL DAILY
COMMUNITY
Start: 2015-03-31

## 2019-04-29 RX ORDER — CALCIUM CARBONATE 500(1250)
1 TABLET ORAL DAILY
COMMUNITY

## 2019-04-29 NOTE — PROGRESS NOTES
Ms. Taylor Verma is a 68 y.o. y/o female with history of Afib Diagnosed 4/10/14. Started warfarin 4/11/15   She presents today for anticoagulation monitoring and adjustment. Pertinent PMH:Had a CV 7/10/15 but is back in a-fib. Patient Reported Findings:  Yes     No  [x]   []       Patient verifies current dosing regimen as listed  []   [x]       S/S bleeding/bruising/swelling/SOB  []   [x]       Blood in urine or stool  []   [x]       Procedures scheduled in the future at this time  []   [x]       Missed dose   []   [x]       Extra dose  []   [x]       Change in medications  []   [x]       Change in health/diet/appetite  []   [x]       Change in alcohol use  []   [x]       Change in activity  []   [x]       Hospital admission  []   [x]       Emergency department visit  []   [x]       Other complaints    Clinical Outcomes:  Yes     No  []   [x]       Major bleeding event  []   [x]       Thromboembolic event    Duration of warfarin Therapy: indefinitely  INR Range:  2.0-3.0    INR is 2.4 today. Continue weekly dose of warfarin 1mg on Mon & Fri and 2mg all other days. Encouraged to maintain a consistency of vegetables/salads.   Recheck INR in 6 weeks 6/10    Referring PCP is Dr. Jazzy Carrizales  INR (no units)   Date Value   04/29/2019 2.4   03/18/2019 2.5   02/06/2019 2.4   12/31/2018 2.9   09/05/2018 3.4   07/23/2018 2.5   06/11/2018 2.9   05/01/2018 1.9

## 2019-06-03 ENCOUNTER — ANTI-COAG VISIT (OUTPATIENT)
Dept: PHARMACY | Age: 69
End: 2019-06-03
Payer: MEDICARE

## 2019-06-03 ENCOUNTER — HOSPITAL ENCOUNTER (OUTPATIENT)
Dept: NON INVASIVE DIAGNOSTICS | Age: 69
Discharge: HOME | End: 2019-06-03
Payer: MEDICARE

## 2019-06-03 ENCOUNTER — OFFICE VISIT (OUTPATIENT)
Dept: CARDIOLOGY CLINIC | Age: 69
End: 2019-06-03
Payer: MEDICARE

## 2019-06-03 VITALS
RESPIRATION RATE: 16 BRPM | HEIGHT: 67 IN | HEART RATE: 102 BPM | DIASTOLIC BLOOD PRESSURE: 87 MMHG | WEIGHT: 173 LBS | SYSTOLIC BLOOD PRESSURE: 135 MMHG | BODY MASS INDEX: 27.15 KG/M2

## 2019-06-03 DIAGNOSIS — I48.91 ATRIAL FIBRILLATION, UNSPECIFIED TYPE (HCC): ICD-10-CM

## 2019-06-03 DIAGNOSIS — I48.91 ATRIAL FIBRILLATION, UNSPECIFIED TYPE (HCC): Primary | ICD-10-CM

## 2019-06-03 DIAGNOSIS — I34.0 MITRAL VALVE INSUFFICIENCY, UNSPECIFIED ETIOLOGY: ICD-10-CM

## 2019-06-03 DIAGNOSIS — I34.0 NON-RHEUMATIC MITRAL REGURGITATION: ICD-10-CM

## 2019-06-03 LAB
INTERNATIONAL NORMALIZATION RATIO, POC: 2.3
LV EF: 60 %
LVEF MODALITY: NORMAL

## 2019-06-03 PROCEDURE — 93306 TTE W/DOPPLER COMPLETE: CPT

## 2019-06-03 PROCEDURE — 4040F PNEUMOC VAC/ADMIN/RCVD: CPT | Performed by: INTERNAL MEDICINE

## 2019-06-03 PROCEDURE — G8419 CALC BMI OUT NRM PARAM NOF/U: HCPCS | Performed by: INTERNAL MEDICINE

## 2019-06-03 PROCEDURE — 99211 OFF/OP EST MAY X REQ PHY/QHP: CPT

## 2019-06-03 PROCEDURE — 1123F ACP DISCUSS/DSCN MKR DOCD: CPT | Performed by: INTERNAL MEDICINE

## 2019-06-03 PROCEDURE — G8399 PT W/DXA RESULTS DOCUMENT: HCPCS | Performed by: INTERNAL MEDICINE

## 2019-06-03 PROCEDURE — 1036F TOBACCO NON-USER: CPT | Performed by: INTERNAL MEDICINE

## 2019-06-03 PROCEDURE — 85610 PROTHROMBIN TIME: CPT

## 2019-06-03 PROCEDURE — 93000 ELECTROCARDIOGRAM COMPLETE: CPT | Performed by: INTERNAL MEDICINE

## 2019-06-03 PROCEDURE — G8427 DOCREV CUR MEDS BY ELIG CLIN: HCPCS | Performed by: INTERNAL MEDICINE

## 2019-06-03 PROCEDURE — 1090F PRES/ABSN URINE INCON ASSESS: CPT | Performed by: INTERNAL MEDICINE

## 2019-06-03 PROCEDURE — 3017F COLORECTAL CA SCREEN DOC REV: CPT | Performed by: INTERNAL MEDICINE

## 2019-06-03 PROCEDURE — 99214 OFFICE O/P EST MOD 30 MIN: CPT | Performed by: INTERNAL MEDICINE

## 2019-06-03 NOTE — PROGRESS NOTES
Ms. Meagan Rodriguez is a 68 y.o. y/o female with history of Afib Diagnosed 4/10/14. Started warfarin 4/11/15   She presents today for anticoagulation monitoring and adjustment. Pertinent PMH:Had a CV 7/10/15 but is back in a-fib. Patient Reported Findings:  Yes     No  [x]   []       Patient verifies current dosing regimen as listed  []   [x]       S/S bleeding/bruising/swelling/SOB  []   [x]       Blood in urine or stool  []   [x]       Procedures scheduled in the future at this time  []   [x]       Missed dose   []   [x]       Extra dose  []   [x]       Change in medications  []   [x]       Change in health/diet/appetite  []   [x]       Change in alcohol use  []   [x]       Change in activity  []   [x]       Hospital admission  []   [x]       Emergency department visit  []   [x]       Other complaints    Clinical Outcomes:  Yes     No  []   [x]       Major bleeding event  []   [x]       Thromboembolic event    Duration of warfarin Therapy: indefinitely  INR Range:  2.0-3.0    INR is 2.3 today. Continue weekly dose of warfarin 1mg on Mon & Fri and 2mg all other days. Encouraged to maintain a consistency of vegetables/salads.   Recheck INR in 6 weeks 7/15    Referring PCP is Dr. Ramya Monroe  INR (no units)   Date Value   06/03/2019 2.3   04/29/2019 2.4   03/18/2019 2.5   02/06/2019 2.4   09/05/2018 3.4   07/23/2018 2.5   06/11/2018 2.9   05/01/2018 1.9

## 2019-06-03 NOTE — PROGRESS NOTES
[facility]   Electrophysiology Followup    Referring Provider:  Abbi Gayle MD     Chief Complaint   Patient presents with    Cardiac Valve Problem     MR    Atrial Fibrillation     Chronic AFIB on warfarin        History of Present Illness:    Ras Callahan is 68 y.o. female who presents today for a follow up visit. History of Atrial fibrillation, HTN, HLD, and Thyroid disease. Atrial fibrillation was found on an ECG during an annual physical.  She is on warfarin with INRs managed through [facility] coumadin clinic. She has a history of snoring but does not stop breathing. On 6/12/15, patient underwent direct cardioversion. Sinus rhythm was restored. At her follow up visit in July, ECG showed Atrial fib with . She did not notice any changes in how she felt post CV.     7/10/15 Holter monitor showed Atrial fib with average HR of 85 bpm (HR range ). It was decided to treat with rate control/anticoagulation. Interval history: Fidelia Maldonado states she feels great. She has been active with yard work. I have lost weight recently. Past Medical History:   has a past medical history of Atrial fibrillation ([address] HLD (hyperlipidemia), HTN (hypertension), and Thyroid disease. Surgical History:   has a past surgical history that includes Cholecystectomy; Cardioversion (6/12/15); and Rotator cuff repair (Right, 01/12/2017). Social History:   reports that she has quit smoking. Her smoking use included cigarettes. She has a 0.75 pack-year smoking history. She has never used smokeless tobacco. She reports that she does not drink alcohol or use drugs. Family History:  family history includes Heart Disease in her father. Home Medications:  Prior to Admission medications    Medication Sig Start Date End Date Taking?  Authorizing Provider   calcium carbonate (OSCAL) 500 MG TABS tablet Take 1 tablet by mouth daily   Yes Historical Provider, MD   Cholecalciferol (VITAMIN D3) 2000 units CAPS Take 1 capsule by mouth daily 3/31/15  Yes Historical Provider, MD   lisinopril-hydrochlorothiazide (PRINZIDE;ZESTORETIC) 10-12.5 MG per tablet 1 tablet daily  16  Yes Historical Provider, MD   warfarin (COUMADIN) 2 MG tablet Take 2 mg /day but on Sun and WED take 1 mg  Rechecked in 7-10 days  Patient taking differently: Take 2 mg by mouth See Admin Instructions Dose adjusted by [facility] 16  Yes Loly Cruz MD   levothyroxine (SYNTHROID) 125 MCG tablet Take 125 mcg by mouth Daily. Yes Historical Provider, MD   pravastatin (PRAVACHOL) 40 MG tablet Take 40 mg by mouth daily. Yes Historical Provider, MD      Allergies:  Pcn [penicillins]     DATA:  Labs reviewed          Diagnostic testing:  Pertinent reports were reviewed as a part of this visit. EK/3/19 Atrial fib  QTcH  398    Echo 5/3/16  Left ventricle size is normal. Normal left ventricular wall thickness. Ejection fraction is visually estimated to be 55-60 %. Mild calcification of the leaflet of the mitral valve. Moderate mitral regurgitation is present. Trivial aortic regurgitation is present. Mild tricuspid regurgitation with RVSP estimated at 39 mmHg. Trivial pulmonic regurgitation present. Echo 6/3/19:  Summary     Left ventricle - normal size, thickness and function with EF of 60%     Mitral valve - moderate regurgitation     Left atrium - dilated     Tricuspid valve - moderate regurgitation with PASP of 33mmHg      Signature      ------------------------------------------------------------------   Electronically signed by Alton Gerber MD (Interpreting   physician) on 2019 at 04:18 PM   ------------------------------------------------------------------    Review of Systems:   · Constitutional: there has been no unanticipated weight loss. There's been no change in energy level, sleep pattern, or activity level. · Eyes: No visual changes or diplopia. No scleral icterus.   · ENT: No Headaches, hearing loss or vertigo. No mouth sores or sore throat. · Cardiovascular: Reviewed in HPI  · Respiratory: No cough or wheezing, no sputum production. No hemoptysis. · Gastrointestinal: No abdominal pain, appetite loss, blood in stools. No change in bowel or bladder habits. No hematemesis. · Genitourinary: No dysuria, trouble voiding, or hematuria. · Musculoskeletal:  No gait disturbance, weakness or joint complaints. Recovering from right shoulder surgery  · Integumentary: No rash or pruritis. · Neurological: No headache, diplopia, change in muscle strength, numbness or tingling. No change in gait, balance, coordination, mood, affect, memory, mentation, behavior. · Psychiatric: No anxiety, no depression. · Endocrine: No malaise, fatigue or temperature intolerance. No excessive thirst, fluid intake, or urination. No tremor. · Hematologic/Lymphatic: No abnormal bruising or bleeding, blood clots or swollen lymph nodes. · Allergic/Immunologic: No nasal congestion or hives. Physical Examination:    VITALS:  /87 (Site: Left Upper Arm, Position: Sitting, Cuff Size: Large Adult)   Pulse 102   Resp 16   Ht 5' 7\" (1.702 m)   Wt 173 lb (78.5 kg)   BMI 27.10 kg/m²     CONSTITUTIONAL: Cooperative, no apparent distress, patient is Obese  NEUROLOGIC:  Awake and orientated to person, place and time. PSYCH: Calm affect. SKIN: Warm and dry. HEENT: Sclera non-icteric, atraumatic   NECK:  neck supple, no elevation of JVP   LUNGS:  No increased work of breathing and clear to auscultation, no crackles or wheezing  CARDIOVASCULAR:  Regular rate and irregular rhythm with 2/6 systolic murmur at the apex, no gallops or rubs. Normal PMI. ABDOMEN:  Normal bowel sounds, non-distended and non-tender to palpation  EXT: No edema, no cyanosis. Risk Factors: The patient does not smoke. The patient does not have known diagnosis of CAD. Patient is not on beta blocker and is not on statin.   Patient is not on antiplatelet therapy. The patient does not have known diagnosis of CHF. Patient is not on beta blocker and is not on AceI/ARB. The patient does have known atrial arrhythmias. Patient is  on anticoagulation. Assessment:   1. Atrial fibrillation, chronic (HCC) - asymptomatic  [id number] Score: 3    Disclaimer: Risk Score calculation is dependent on accuracy of patient problem list and past encounter diagnosis. S/P direct cardioversion 6/12/15. Was back in Atrial fib in 7/2015. ECG today shows atrial fibrillation, VR 80's. 7/2015 Holter - Atrial fib with average HR 85 bpm.     Patient is not on rate regulating meds. On warfarin with INRs managed through [facility] coumadin clinic. 2.  Hypertension     stable     3. Hyperlipidemia: on Pravastatin 40 mg daily. Monitored by Dr. Marlys Saint. 4.  Mitral regurgitation - moderate per echo in 5/2016 and today without any chf sx including any exertional sx  Patient does not think she has sleep apnea. She is asymptomatic. Plan on referring to Dr. Niko Johnston in 2 years     Plan:  1. No change in medications. 2.  Monitor for symptomatic A fib. 3. Dr. Niko Johnston referral in two years for stable Mod MR or sooner if sx  4. Stable afib thus follow up EP prn     Scribe attestation: This note was scribed in the presence of Jeff Harris MD by Aleks Pastrana RN  The scribes documentation has been prepared under my direction and personally reviewed by me in its entirety. I confirm that the note above accurately reflects all work, treatment, procedures, and medical decision making performed by me.     Information sent to PCP    Jeff Harris MD

## 2019-07-15 ENCOUNTER — ANTI-COAG VISIT (OUTPATIENT)
Dept: PHARMACY | Age: 69
End: 2019-07-15
Payer: MEDICARE

## 2019-07-15 DIAGNOSIS — I48.91 ATRIAL FIBRILLATION, UNSPECIFIED TYPE (HCC): ICD-10-CM

## 2019-07-15 LAB — INTERNATIONAL NORMALIZATION RATIO, POC: 2

## 2019-07-15 PROCEDURE — 99211 OFF/OP EST MAY X REQ PHY/QHP: CPT

## 2019-07-15 PROCEDURE — 85610 PROTHROMBIN TIME: CPT

## 2019-07-15 NOTE — PROGRESS NOTES
Ms. Marija Clay is a 68 y.o. y/o female with history of Afib Diagnosed 4/10/14. Started warfarin 4/11/15   She presents today for anticoagulation monitoring and adjustment. Pertinent PMH:Had a CV 7/10/15 but is back in a-fib. Patient Reported Findings:  Yes     No  [x]   []       Patient verifies current dosing regimen as listed  []   [x]       S/S bleeding/bruising/swelling/SOB  []   [x]       Blood in urine or stool  []   [x]       Procedures scheduled in the future at this time  []   [x]       Missed dose   []   [x]       Extra dose  []   [x]       Change in medications  [x]   []       Change in health/diet/appetite slight inc in salads and vit k vegetables since fresh   []   [x]       Change in alcohol use  []   [x]       Change in activity  []   [x]       Hospital admission  []   [x]       Emergency department visit  []   [x]       Other complaints    Clinical Outcomes:  Yes     No  []   [x]       Major bleeding event  []   [x]       Thromboembolic event    Duration of warfarin Therapy: indefinitely  INR Range:  2.0-3.0     INR is 2.0 today. Continue weekly dose of warfarin 1mg on Mon & Fri and 2mg all other days. Encouraged to maintain a consistency of vegetables/salads.   Recheck INR in 6 weeks 8/26    Referring PCP is Dr. Jj Colón  INR (no units)   Date Value   07/15/2019 2   06/03/2019 2.3   04/29/2019 2.4   03/18/2019 2.5   09/05/2018 3.4   07/23/2018 2.5   06/11/2018 2.9   05/01/2018 1.9

## 2019-08-26 ENCOUNTER — ANTI-COAG VISIT (OUTPATIENT)
Dept: PHARMACY | Age: 69
End: 2019-08-26
Payer: MEDICARE

## 2019-08-26 DIAGNOSIS — I48.91 ATRIAL FIBRILLATION, UNSPECIFIED TYPE (HCC): ICD-10-CM

## 2019-08-26 LAB — INTERNATIONAL NORMALIZATION RATIO, POC: 2.4

## 2019-08-26 PROCEDURE — 85610 PROTHROMBIN TIME: CPT

## 2019-08-26 PROCEDURE — 99211 OFF/OP EST MAY X REQ PHY/QHP: CPT

## 2019-10-07 ENCOUNTER — ANTI-COAG VISIT (OUTPATIENT)
Dept: PHARMACY | Age: 69
End: 2019-10-07
Payer: MEDICARE

## 2019-10-07 DIAGNOSIS — I48.91 ATRIAL FIBRILLATION, UNSPECIFIED TYPE (HCC): ICD-10-CM

## 2019-10-07 LAB — INTERNATIONAL NORMALIZATION RATIO, POC: 2.6

## 2019-10-07 PROCEDURE — 85610 PROTHROMBIN TIME: CPT

## 2019-10-07 PROCEDURE — 99211 OFF/OP EST MAY X REQ PHY/QHP: CPT

## 2019-11-18 ENCOUNTER — ANTI-COAG VISIT (OUTPATIENT)
Dept: PHARMACY | Age: 69
End: 2019-11-18
Payer: MEDICARE

## 2019-11-18 DIAGNOSIS — I48.91 ATRIAL FIBRILLATION, UNSPECIFIED TYPE (HCC): ICD-10-CM

## 2019-11-18 LAB — INTERNATIONAL NORMALIZATION RATIO, POC: 2.5

## 2019-11-18 PROCEDURE — 85610 PROTHROMBIN TIME: CPT

## 2019-11-18 PROCEDURE — 99211 OFF/OP EST MAY X REQ PHY/QHP: CPT

## 2019-12-23 ENCOUNTER — ANTI-COAG VISIT (OUTPATIENT)
Dept: PHARMACY | Age: 69
End: 2019-12-23
Payer: MEDICARE

## 2019-12-23 ENCOUNTER — HOSPITAL ENCOUNTER (OUTPATIENT)
Age: 69
Discharge: HOME | End: 2019-12-23
Payer: MEDICARE

## 2019-12-23 DIAGNOSIS — I48.91 ATRIAL FIBRILLATION, UNSPECIFIED TYPE (HCC): ICD-10-CM

## 2019-12-23 LAB
BASOPHILS ABSOLUTE: 0 K/UL (ref 0–0.2)
BASOPHILS RELATIVE PERCENT: 1 %
EOSINOPHILS ABSOLUTE: 0.1 K/UL (ref 0–0.6)
EOSINOPHILS RELATIVE PERCENT: 2.2 %
HCT VFR BLD CALC: 45.5 % (ref 36–48)
HEMOGLOBIN: 15 G/DL (ref 12–16)
INTERNATIONAL NORMALIZATION RATIO, POC: 2.3
LYMPHOCYTES ABSOLUTE: 2.1 K/UL (ref 1–5.1)
LYMPHOCYTES RELATIVE PERCENT: 55.8 %
MCH RBC QN AUTO: 30.5 PG (ref 26–34)
MCHC RBC AUTO-ENTMCNC: 33 G/DL (ref 31–36)
MCV RBC AUTO: 92.3 FL (ref 80–100)
MONOCYTES ABSOLUTE: 0.3 K/UL (ref 0–1.3)
MONOCYTES RELATIVE PERCENT: 8.5 %
NEUTROPHILS ABSOLUTE: 1.2 K/UL (ref 1.7–7.7)
NEUTROPHILS RELATIVE PERCENT: 32.5 %
PDW BLD-RTO: 13.7 % (ref 12.4–15.4)
PLATELET # BLD: 197 K/UL (ref 135–450)
PMV BLD AUTO: 10.1 FL (ref 5–10.5)
RBC # BLD: 4.92 M/UL (ref 4–5.2)
WBC # BLD: 3.8 K/UL (ref 4–11)

## 2019-12-23 PROCEDURE — 99211 OFF/OP EST MAY X REQ PHY/QHP: CPT

## 2019-12-23 PROCEDURE — 36415 COLL VENOUS BLD VENIPUNCTURE: CPT

## 2019-12-23 PROCEDURE — 85610 PROTHROMBIN TIME: CPT

## 2019-12-23 PROCEDURE — 85025 COMPLETE CBC W/AUTO DIFF WBC: CPT

## 2020-02-03 ENCOUNTER — ANTI-COAG VISIT (OUTPATIENT)
Dept: PHARMACY | Age: 70
End: 2020-02-03
Payer: MEDICARE

## 2020-02-03 LAB — INTERNATIONAL NORMALIZATION RATIO, POC: 2.2

## 2020-02-03 PROCEDURE — 85610 PROTHROMBIN TIME: CPT

## 2020-02-03 PROCEDURE — 99211 OFF/OP EST MAY X REQ PHY/QHP: CPT

## 2020-02-03 NOTE — PROGRESS NOTES
Ms. Mariposa Garcia is a 68 y.o. y/o female with history of Afib Diagnosed 4/10/14. Started warfarin 4/11/15   She presents today for anticoagulation monitoring and adjustment. Pertinent PMH:Had a CV 7/10/15 but is back in a-fib. Patient Reported Findings:  Yes     No  [x]   []       Patient verifies current dosing regimen as listed  []   [x]       S/S bleeding/bruising/swelling/SOB  []   [x]       Blood in urine or stool  []   [x]       Procedures scheduled in the future at this time  []   [x]       Missed dose  []   [x]       Extra dose  []   [x]       Change in medications   []   [x]       Change in health/diet/appetite   []   [x]       Change in alcohol use  []   [x]       Change in activity  []   [x]       Hospital admission  []   [x]       Emergency department visit  []   [x]       Other complaints    Clinical Outcomes:  Yes     No  []   [x]       Major bleeding event  []   [x]       Thromboembolic event    Duration of warfarin Therapy: indefinitely  INR Range:  2.0-3.0     INR is 2.3 today. Continue weekly dose of warfarin 1mg on Mon & Fri and 2mg all other days. Encouraged to maintain a consistency of vegetables/salads.   Recheck INR in 6 weeks 3/16    **Refused AVS and apt card**    Referring PCP is Dr. Gil Romeo  INR (no units)   Date Value   02/03/2020 2.2   12/23/2019 2.3   11/18/2019 2.5   10/07/2019 2.6   09/05/2018 3.4   07/23/2018 2.5   06/11/2018 2.9   05/01/2018 1.9

## 2020-03-16 ENCOUNTER — ANTI-COAG VISIT (OUTPATIENT)
Dept: PHARMACY | Age: 70
End: 2020-03-16
Payer: MEDICARE

## 2020-03-16 LAB — INTERNATIONAL NORMALIZATION RATIO, POC: 2.6

## 2020-03-16 PROCEDURE — 99211 OFF/OP EST MAY X REQ PHY/QHP: CPT

## 2020-03-16 PROCEDURE — 85610 PROTHROMBIN TIME: CPT

## 2020-03-16 RX ORDER — TRAZODONE HYDROCHLORIDE 50 MG/1
50 TABLET ORAL NIGHTLY
COMMUNITY

## 2020-03-16 NOTE — PROGRESS NOTES
Ms. Kelly Hogan is a 65 y.o. y/o female with history of Afib Diagnosed 4/10/14. Started warfarin 4/11/15   She presents today for anticoagulation monitoring and adjustment. Pertinent PMH:Had a CV 7/10/15 but is back in a-fib. Patient Reported Findings:  Yes     No  [x]   []       Patient verifies current dosing regimen as listed  []   [x]       S/S bleeding/bruising/swelling/SOB  []   [x]       Blood in urine or stool  []   [x]       Procedures scheduled in the future at this time  []   [x]       Missed dose  []   [x]       Extra dose  []   [x]       Change in medications -denies   []   [x]       Change in health/diet/appetite -denies   []   [x]       Change in alcohol use  []   [x]       Change in activity  []   [x]       Hospital admission  []   [x]       Emergency department visit  []   [x]       Other complaints    Clinical Outcomes:  Yes     No  []   [x]       Major bleeding event  []   [x]       Thromboembolic event    Duration of warfarin Therapy: indefinitely  INR Range:  2.0-3.0     INR is 2.6 today. Continue weekly dose of warfarin 1mg on Mon & Fri and 2mg all other days. Encouraged to maintain a consistency of vegetables/salads. Recheck INR in 8 weeks 3/16- Extended to 8 weeks dt COVID-19 but explained need for pt to contact us with questions/concerns and updated medications or if they require a RF.      **Refused AVS and apt card**    Referring PCP is Dr. Jojo Boston  INR (no units)   Date Value   03/16/2020 2.6   02/03/2020 2.2   12/23/2019 2.3   11/18/2019 2.5   09/05/2018 3.4   07/23/2018 2.5   06/11/2018 2.9   05/01/2018 1.9

## 2020-05-04 ENCOUNTER — TELEPHONE (OUTPATIENT)
Dept: PHARMACY | Age: 70
End: 2020-05-04

## 2020-05-04 NOTE — TELEPHONE ENCOUNTER
Called and spoke with . Wife is out of the house, will have her call us when she gets home. Due to COVID-19, our clinic is converting to telephone appointments. I am calling to adjust your visit to a telephone/virtual visit for your warfarin blood thinner INR check and dosing. We are trying to minimize the number of people that are coming into the facilities and help prevent exposure for both yourself and our team. Because you wont be coming in to the clinic, we are asking that you go to the laboratory draw site to have your INR checked. We are asking that you get your lab drawn on the day of your appointment (time doesn't matter as long as you go during hours of operation), so please go sometime 5/11 to the lab to get your INR drawn. Then we will be contacting you via telephone with results and we will conduct the whole appointment over the telephone. Patient has not yet selected a location to get their INR drawn.     Lauren Hebert, PharmD, [facility]

## 2020-06-08 ENCOUNTER — ANTI-COAG VISIT (OUTPATIENT)
Dept: PHARMACY | Age: 70
End: 2020-06-08
Payer: MEDICARE

## 2020-06-08 VITALS — TEMPERATURE: 97.2 F

## 2020-06-08 LAB — INTERNATIONAL NORMALIZATION RATIO, POC: 2.3

## 2020-06-08 PROCEDURE — 85610 PROTHROMBIN TIME: CPT

## 2020-06-08 PROCEDURE — 99211 OFF/OP EST MAY X REQ PHY/QHP: CPT

## 2020-07-27 ENCOUNTER — ANTI-COAG VISIT (OUTPATIENT)
Dept: PHARMACY | Age: 70
End: 2020-07-27
Payer: MEDICARE

## 2020-07-27 VITALS — TEMPERATURE: 98.1 F

## 2020-07-27 LAB — INTERNATIONAL NORMALIZATION RATIO, POC: 2.4

## 2020-07-27 PROCEDURE — 85610 PROTHROMBIN TIME: CPT

## 2020-07-27 PROCEDURE — 99211 OFF/OP EST MAY X REQ PHY/QHP: CPT

## 2020-09-14 ENCOUNTER — ANTI-COAG VISIT (OUTPATIENT)
Dept: PHARMACY | Age: 70
End: 2020-09-14
Payer: MEDICARE

## 2020-09-14 VITALS — TEMPERATURE: 96.9 F

## 2020-09-14 LAB — INTERNATIONAL NORMALIZATION RATIO, POC: 2.6

## 2020-09-14 PROCEDURE — 85610 PROTHROMBIN TIME: CPT

## 2020-09-14 PROCEDURE — 99211 OFF/OP EST MAY X REQ PHY/QHP: CPT

## 2020-09-14 NOTE — PROGRESS NOTES
Ms. Tona Mcwilliams is a 65 y.o. y/o female with history of Afib Diagnosed 4/10/14. Started warfarin 4/11/15   She presents today for anticoagulation monitoring and adjustment. Pertinent PMH:Had a CV 7/10/15 but is back in a-fib. Patient Reported Findings:  Yes     No  [x]   []       Patient verifies current dosing regimen as listed   []   [x]       S/S bleeding/bruising/swelling/SOB  []   [x]       Blood in urine or stool  []   [x]       Procedures scheduled in the future at this time  []   [x]       Missed dose   []   [x]       Extra dose  []   [x]       Change in medications -denies   []   [x]       Change in health/diet/appetite -possibly a bit more fresh vegetables --> no changes   []   [x]       Change in alcohol use  []   [x]       Change in activity  []   [x]       Hospital admission  []   [x]       Emergency department visit  []   [x]       Other complaints    Clinical Outcomes:  Yes     No  []   [x]       Major bleeding event  []   [x]       Thromboembolic event    Duration of warfarin Therapy: indefinitely  INR Range:  2.0-3.0     INR is 2.6 today. Continue weekly dose of warfarin 1mg on Mon & Fri and 2mg all other days. Encouraged to maintain a consistency of vegetables/salads.   Recheck INR in 6 weeks, 10/26     **Refused AVS and apt card**    Referring PCP is Dr. Donny Jones  INR (no units)   Date Value   07/27/2020 2.4   06/08/2020 2.3   03/16/2020 2.6   02/03/2020 2.2   09/05/2018 3.4   07/23/2018 2.5   06/11/2018 2.9   05/01/2018 1.9       CLINICAL PHARMACY CONSULT: MED RECONCILIATION/REVIEW ADDENDUM    For Pharmacy Admin Tracking Only    PHSO: No  Total # of Interventions Recommended: 0  - Maintenance Safety Lab Monitoring #: 1  Total Interventions Accepted: 0  Time Spent (min): 15    Natalie Garnett, PharmD

## 2020-10-26 ENCOUNTER — ANTI-COAG VISIT (OUTPATIENT)
Dept: PHARMACY | Age: 70
End: 2020-10-26
Payer: MEDICARE

## 2020-10-26 VITALS — TEMPERATURE: 95.9 F

## 2020-10-26 LAB — INTERNATIONAL NORMALIZATION RATIO, POC: 2.5

## 2020-10-26 PROCEDURE — 85610 PROTHROMBIN TIME: CPT

## 2020-10-26 PROCEDURE — 99211 OFF/OP EST MAY X REQ PHY/QHP: CPT

## 2020-10-26 NOTE — PROGRESS NOTES
Ms. Kusum Aguirre is a 65 y.o. y/o female with history of Afib Diagnosed 4/10/14. Started warfarin 4/11/15   She presents today for anticoagulation monitoring and adjustment. Pertinent PMH:Had a CV 7/10/15 but is back in a-fib. Patient Reported Findings:  Yes     No  [x]   []       Patient verifies current dosing regimen as listed   []   [x]       S/S bleeding/bruising/swelling/SOB  []   [x]       Blood in urine or stool  []   [x]       Procedures scheduled in the future at this time  []   [x]       Missed dose   []   [x]       Extra dose  [x]   []       Change in medications -had been on prednisone x 10 days end of sept. []   [x]       Change in health/diet/appetite -possibly a bit more fresh vegetables --> no changes   []   [x]       Change in alcohol use  []   [x]       Change in activity  []   [x]       Hospital admission  []   [x]       Emergency department visit  []   [x]       Other complaints    Clinical Outcomes:  Yes     No  []   [x]       Major bleeding event  []   [x]       Thromboembolic event    Duration of warfarin Therapy: indefinitely  INR Range:  2.0-3.0     INR is 2.6 today. Continue weekly dose of warfarin 1mg on Mon & Fri and 2mg all other days. Encouraged to maintain a consistency of vegetables/salads.   Recheck INR in 6 weeks, 12/7     **Refused AVS and apt card**    Referring PCP is Dr. Greg Smiley  INR (no units)   Date Value   10/26/2020 2.5   09/14/2020 2.6   07/27/2020 2.4   06/08/2020 2.3   09/05/2018 3.4   07/23/2018 2.5   06/11/2018 2.9   05/01/2018 1.9       CLINICAL PHARMACY CONSULT: MED RECONCILIATION/REVIEW ADDENDUM    For Pharmacy Admin Tracking Only    PHSO: No  Total # of Interventions Recommended: 0  - Maintenance Safety Lab Monitoring #: 1  Total Interventions Accepted: 0  Time Spent (min): 15    Johanna Ernandez, PharmD

## 2020-12-05 ENCOUNTER — APPOINTMENT (OUTPATIENT)
Dept: CT IMAGING | Age: 70
End: 2020-12-05
Payer: MEDICARE

## 2020-12-05 ENCOUNTER — APPOINTMENT (OUTPATIENT)
Dept: GENERAL RADIOLOGY | Age: 70
End: 2020-12-05
Payer: MEDICARE

## 2020-12-05 ENCOUNTER — HOSPITAL ENCOUNTER (EMERGENCY)
Age: 70
Discharge: HOME | End: 2020-12-05
Attending: EMERGENCY MEDICINE
Payer: MEDICARE

## 2020-12-05 VITALS
BODY MASS INDEX: 27.16 KG/M2 | RESPIRATION RATE: 15 BRPM | HEART RATE: 77 BPM | WEIGHT: 169 LBS | DIASTOLIC BLOOD PRESSURE: 47 MMHG | TEMPERATURE: 98.5 F | HEIGHT: 66 IN | SYSTOLIC BLOOD PRESSURE: 114 MMHG | OXYGEN SATURATION: 95 %

## 2020-12-05 LAB
A/G RATIO: 1.5 (ref 1.1–2.2)
ALBUMIN SERPL-MCNC: 4.1 G/DL (ref 3.4–5)
ALP BLD-CCNC: 72 U/L (ref 40–129)
ALT SERPL-CCNC: 15 U/L (ref 10–40)
ANION GAP SERPL CALCULATED.3IONS-SCNC: 10 MMOL/L (ref 3–16)
AST SERPL-CCNC: 26 U/L (ref 15–37)
BASOPHILS ABSOLUTE: 0 K/UL (ref 0–0.2)
BASOPHILS RELATIVE PERCENT: 0.3 %
BILIRUB SERPL-MCNC: 0.4 MG/DL (ref 0–1)
BILIRUBIN URINE: NEGATIVE
BLOOD, URINE: ABNORMAL
BUN BLDV-MCNC: 22 MG/DL (ref 7–20)
CALCIUM SERPL-MCNC: 9.3 MG/DL (ref 8.3–10.6)
CHLORIDE BLD-SCNC: 102 MMOL/L (ref 99–110)
CLARITY: CLEAR
CO2: 23 MMOL/L (ref 21–32)
COLOR: YELLOW
CREAT SERPL-MCNC: 0.7 MG/DL (ref 0.6–1.2)
EKG ATRIAL RATE: 90 BPM
EKG DIAGNOSIS: NORMAL
EKG Q-T INTERVAL: 394 MS
EKG QRS DURATION: 76 MS
EKG QTC CALCULATION (BAZETT): 462 MS
EKG R AXIS: 55 DEGREES
EKG T AXIS: 29 DEGREES
EKG VENTRICULAR RATE: 83 BPM
EOSINOPHILS ABSOLUTE: 0 K/UL (ref 0–0.6)
EOSINOPHILS RELATIVE PERCENT: 0.1 %
EPITHELIAL CELLS, UA: 2 /HPF (ref 0–5)
GFR AFRICAN AMERICAN: >60
GFR NON-AFRICAN AMERICAN: >60
GLOBULIN: 2.8 G/DL
GLUCOSE BLD-MCNC: 120 MG/DL (ref 70–99)
GLUCOSE URINE: NEGATIVE MG/DL
HCT VFR BLD CALC: 42 % (ref 36–48)
HEMOGLOBIN: 14.3 G/DL (ref 12–16)
HYALINE CASTS: 1 /LPF (ref 0–8)
KETONES, URINE: NEGATIVE MG/DL
LEUKOCYTE ESTERASE, URINE: NEGATIVE
LYMPHOCYTES ABSOLUTE: 1 K/UL (ref 1–5.1)
LYMPHOCYTES RELATIVE PERCENT: 12.3 %
MCH RBC QN AUTO: 31 PG (ref 26–34)
MCHC RBC AUTO-ENTMCNC: 33.9 G/DL (ref 31–36)
MCV RBC AUTO: 91.4 FL (ref 80–100)
MICROSCOPIC EXAMINATION: YES
MONOCYTES ABSOLUTE: 0.4 K/UL (ref 0–1.3)
MONOCYTES RELATIVE PERCENT: 4.5 %
NEUTROPHILS ABSOLUTE: 6.6 K/UL (ref 1.7–7.7)
NEUTROPHILS RELATIVE PERCENT: 82.8 %
NITRITE, URINE: NEGATIVE
PDW BLD-RTO: 13.5 % (ref 12.4–15.4)
PH UA: 6 (ref 5–8)
PLATELET # BLD: 174 K/UL (ref 135–450)
PMV BLD AUTO: 9.6 FL (ref 5–10.5)
POTASSIUM REFLEX MAGNESIUM: 4 MMOL/L (ref 3.5–5.1)
PROTEIN UA: NEGATIVE MG/DL
RBC # BLD: 4.6 M/UL (ref 4–5.2)
RBC UA: 10 /HPF (ref 0–4)
REASON FOR REJECTION: NORMAL
REJECTED TEST: NORMAL
SODIUM BLD-SCNC: 135 MMOL/L (ref 136–145)
SPECIFIC GRAVITY UA: 1.02 (ref 1–1.03)
TOTAL PROTEIN: 6.9 G/DL (ref 6.4–8.2)
TROPONIN: <0.01 NG/ML
URINE REFLEX TO CULTURE: ABNORMAL
URINE TYPE: ABNORMAL
UROBILINOGEN, URINE: 1 E.U./DL
WBC # BLD: 7.9 K/UL (ref 4–11)
WBC UA: 1 /HPF (ref 0–5)

## 2020-12-05 PROCEDURE — 73502 X-RAY EXAM HIP UNI 2-3 VIEWS: CPT

## 2020-12-05 PROCEDURE — 84484 ASSAY OF TROPONIN QUANT: CPT

## 2020-12-05 PROCEDURE — 71045 X-RAY EXAM CHEST 1 VIEW: CPT

## 2020-12-05 PROCEDURE — 85025 COMPLETE CBC W/AUTO DIFF WBC: CPT

## 2020-12-05 PROCEDURE — 99283 EMERGENCY DEPT VISIT LOW MDM: CPT

## 2020-12-05 PROCEDURE — 93010 ELECTROCARDIOGRAM REPORT: CPT | Performed by: INTERNAL MEDICINE

## 2020-12-05 PROCEDURE — 81001 URINALYSIS AUTO W/SCOPE: CPT

## 2020-12-05 PROCEDURE — 70450 CT HEAD/BRAIN W/O DYE: CPT

## 2020-12-05 PROCEDURE — 72100 X-RAY EXAM L-S SPINE 2/3 VWS: CPT

## 2020-12-05 PROCEDURE — 93005 ELECTROCARDIOGRAM TRACING: CPT | Performed by: PHYSICIAN ASSISTANT

## 2020-12-05 PROCEDURE — 36415 COLL VENOUS BLD VENIPUNCTURE: CPT

## 2020-12-05 PROCEDURE — 80053 COMPREHEN METABOLIC PANEL: CPT

## 2020-12-05 PROCEDURE — 72125 CT NECK SPINE W/O DYE: CPT

## 2020-12-05 ASSESSMENT — ENCOUNTER SYMPTOMS
SHORTNESS OF BREATH: 0
VOMITING: 0
ABDOMINAL PAIN: 0
NAUSEA: 0

## 2020-12-05 ASSESSMENT — PAIN DESCRIPTION - LOCATION: LOCATION: HIP

## 2020-12-05 ASSESSMENT — PAIN DESCRIPTION - ORIENTATION: ORIENTATION: RIGHT

## 2020-12-05 ASSESSMENT — PAIN DESCRIPTION - PAIN TYPE: TYPE: ACUTE PAIN

## 2020-12-05 ASSESSMENT — PAIN DESCRIPTION - DIRECTION: RADIATING_TOWARDS: PAIN WHEN MOVING

## 2020-12-05 ASSESSMENT — PAIN SCALES - GENERAL: PAINLEVEL_OUTOF10: 0

## 2020-12-05 NOTE — ED NOTES
Pt Discharged in stable condition, VSS, no signs of distress, discharge instructions reviewed. Pt verbalizes understanding and states no further questions or concerns unaddressed. Pt taken to car via wheelchair by Claudy Hawkins, being driven home by .      Juan José Carpio RN  12/05/20 [id number]
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
## 2020-12-05 NOTE — ED PROVIDER NOTES
components:       Result Value    Sodium 135 (*)     Glucose 120 (*)     BUN 22 (*)     All other components within normal limits    Narrative:     Performed at:  [facility] [address]   Phone [phone]   URINE RT REFLEX TO CULTURE - Abnormal; Notable for the following components:    Blood, Urine SMALL (*)     All other components within normal limits    Narrative:     Performed at:  [facility] [address]   Phone [phone]   MICROSCOPIC URINALYSIS - Abnormal; Notable for the following components:    RBC, UA 10 (*)     All other components within normal limits    Narrative:     Performed at:  [facility] [address]   Phone [phone]    Narrative:     [address],  Rejected Test cbcwd/Called to:Leann Gold, 2020 04:14, by Augustine Obrien  Performed at:  [facility] [address] [place], [facility]   Phone [phone]   CBC WITH AUTO DIFFERENTIAL    Narrative:     Performed at:  [facility] [address]   Phone [phone]   TROPONIN    Narrative:     Performed at:  [facility] [address] [place]   Phone [phone]           EKG:    Atrial fibrillation at a rate of 83 beats a minute with no acute ST elevations or depressions or pathologic Q waves. Normal axis. Exam:    Well-nourished female no acute distress. She was neurologically intact with no focal findings. Medical decision makin-year-old female presents after a fall. The patient had some initial hip pain and actually states she almost passed out from the pain. The patient did not actually pass out and there is no true syncopal episode.   The patient has been observed for over 9 hours here and was unremarkable on the monitor. Multiple x-rays were obtained including a CT of the head that were negative and normal and her laboratory results were unremarkable. The patient will be discharged with instructions to take over-the-counter medications for pain. I referred her back to her primary care physician in 2 days for recheck and she is to return if worse. FINAL IMPRESSION:    1. Acute right hip pain    2.  Near syncope           Chuy Dickinson MD  12/05/20 [id number]

## 2020-12-05 NOTE — ED NOTES
Pt ambulatory to the bathroom, minimal assistance needing. Pt complains of pain when returning back to bed and lifting legs into the bed. Pt denies any further needs at this time.       Estrella Cervantes RN  12/05/20 [id number]

## 2020-12-05 NOTE — ED PROVIDER NOTES
[facility]        Pt Name: Diogo Dominguez  MRN: [id number]  [place] 1950  Date of evaluation: 12/5/2020  Provider: Manuel Diaz PA-C  PCP: Siobhan Daniel MD     I have seen and evaluated this patient with my supervising physician Bishop       Chief Complaint   Patient presents with    Hip Pain       HISTORY OF PRESENT ILLNESS   (Location, Timing/Onset, Context/Setting, Quality, Duration, Modifying Factors, Severity, Associated Signs and Symptoms)  Note limiting factors. Diogo Dominguez is a 65 y.o. female patient presents emergency department for evaluation of pain to her right hip. Patient states she had a mechanical fall on the stairs, landing on her buttocks and slid down 4 stairs. Patient states she did not hit her head. She is on a blood thinner. Patient denies any loss of consciousness at the time of the initial injury. Patient states she was able to climb the stairs to go get into her recliner. Patient states that she had gotten up due to having some of the pain in her right hip and was trying to lean on a low wall in her dining room to find a comfortable position when she passed out. Patient woke up after about 30 seconds on the ground. She did not have any seizure-like activity. Her  called the ambulance and she was awake and alert by the time they arrived. Patient does not feel lightheaded or dizzy now. Patient states all of her pain is to her right posterior hip. She states she is able to bear weight but she will occasionally find a particular movement that is exquisitely painful. She has not to describe to me exactly what this movement is. She denies any numbness or tingling in her legs. Denies any loss of bowel or bladder function. Denies any additional injuries at this time.   Nursing Notes were all reviewed and agreed with or any disagreements were addressed in the HPI.    REVIEW OF SYSTEMS    (2-9 systems for level 4, 10 or more for level 5)     Review of Systems   Constitutional: Negative for fatigue and fever. HENT: Negative. Eyes: Negative for visual disturbance. Respiratory: Negative for shortness of breath. Cardiovascular: Negative for chest pain. Gastrointestinal: Negative for abdominal pain, nausea and vomiting. Musculoskeletal: Negative. Skin: Negative. Neurological: Negative. Positives and Pertinent negatives as per HPI. Except as noted above in the ROS, all other systems were reviewed and negative. PAST MEDICAL HISTORY     Past Medical History:   Diagnosis Date    Atrial fibrillation ([address]     HLD (hyperlipidemia)     HTN (hypertension)     Thyroid disease          SURGICAL HISTORY     Past Surgical History:   Procedure Laterality Date    CARDIOVERSION  6/12/15    CHOLECYSTECTOMY      ROTATOR CUFF REPAIR Right 01/12/2017    RIGHT SHOULDER ARTHROSCOPY WITH ROTATOR CUFF REPAIR, SUBACROMIAL DECOMPRESSION AND DISTAL CLAVICLE EXCISION         CURRENTMEDICATIONS       Discharge Medication List as of 12/5/2020 10:26 AM      CONTINUE these medications which have NOT CHANGED    Details   traZODone (DESYREL) 50 MG tablet Take 50 mg by mouth nightlyHistorical Med      calcium carbonate (OSCAL) 500 MG TABS tablet Take 1 tablet by mouth dailyHistorical Med      Cholecalciferol (VITAMIN D3) 2000 units CAPS Take 1 capsule by mouth dailyHistorical Med      lisinopril-hydrochlorothiazide (PRINZIDE;ZESTORETIC) 10-12.5 MG per tablet 1 tablet daily       warfarin (COUMADIN) 2 MG tablet Take 2 mg /day but on Sun and WED take 1 mg  Rechecked in 7-10 days, Disp-30 tablet, R-3      levothyroxine (SYNTHROID) 125 MCG tablet Take 125 mcg by mouth Daily. pravastatin (PRAVACHOL) 40 MG tablet Take 40 mg by mouth daily.                ALLERGIES     Pcn [penicillins]    FAMILYHISTORY       Family History   Problem Relation Age of Onset    Heart Disease Back:       Right lower leg: No edema. Left lower leg: No edema. Skin:     General: Skin is warm and dry. Capillary Refill: Capillary refill takes less than 2 seconds. Coloration: Skin is not jaundiced or pale. Neurological:      General: No focal deficit present. Mental Status: She is alert and oriented to person, place, and time. GCS: GCS eye subscore is 4. GCS verbal subscore is 5. GCS motor subscore is 6. Cranial Nerves: Cranial nerves are intact. Sensory: Sensation is intact. Motor: Motor function is intact. Coordination: Coordination is intact.    Psychiatric:         Mood and Affect: Mood normal.         Behavior: Behavior normal.         DIAGNOSTIC RESULTS   LABS:    Labs Reviewed   COMPREHENSIVE METABOLIC PANEL W/ REFLEX TO MG FOR LOW K - Abnormal; Notable for the following components:       Result Value    Sodium 135 (*)     Glucose 120 (*)     BUN 22 (*)     All other components within normal limits    Narrative:     Performed at:  [facility] [address],  [place], [employer]   Phone [phone]   URINE RT REFLEX TO CULTURE - Abnormal; Notable for the following components:    Blood, Urine SMALL (*)     All other components within normal limits    Narrative:     Performed at:  [facility] [address] [facility] [address] [employer]   Phone [phone]   MICROSCOPIC URINALYSIS - Abnormal; Notable for the following components:    RBC, UA 10 (*)     All other components within normal limits    Narrative:     Performed at:  [facility] [address] [employer]   Phone [phone]    Narrative:     Klever Wong. [id number],  Rejected Test cbcwd/Called to:Leann Bojorquez RN, 12/05/2020 04:14, by Nanda Maciel  Performed at:  [facility] [address] [facility], [employer]   Phone [phone]   CBC WITH AUTO DIFFERENTIAL    Narrative:     Performed at:  [facility]  [address] [facility]  [place], [address]   Phone [phone]   TROPONIN    Narrative:     Performed at:  [facility]  [address] [facility],  [place], [address]   Phone [phone]       All other labs were within normal range or not returned as of this dictation. EKG: All EKG's are interpreted by the Emergency Department Physician in the absence of a cardiologist.  Please see their note for interpretation of EKG. RADIOLOGY:   Non-plain film images such as CT, Ultrasound and MRI are read by the radiologist. Plain radiographic images are visualized and preliminarily interpreted by the ED Provider with the below findings:        Interpretation per the Radiologist below, if available at the time of this note:    XR CHEST PORTABLE   Final Result   No acute cardiopulmonary process         XR LUMBAR SPINE (2-3 VIEWS)   Final Result   Mild multilevel degenerate change. No acute fracture or traumatic   malalignment         XR HIP 2-3 VW W PELVIS RIGHT   Final Result   No fracture or dislocation. CT Cervical Spine WO Contrast   Final Result   Negative acute fracture or traumatic malalignment. Multilevel degenerate changes         CT Head WO Contrast   Final Result   No acute intracranial abnormality. No results found.         PROCEDURES   Unless otherwise noted below, none     Procedures    CRITICAL CARE TIME   N/A    CONSULTS:  None      EMERGENCY DEPARTMENT COURSE and DIFFERENTIAL DIAGNOSIS/MDM:   Vitals:    Vitals:    12/05/20 0830 12/05/20 0900 12/05/20 0930 12/05/20 1000   BP: (!) 119/53 106/62 (!) 107/53 (!) 114/47   Pulse: 80 79 79 77   Resp: 17 18 23 15   Temp:       TempSrc:       SpO2: 93% 93% 97% 95%   Weight:       Height:           Patient was given the following medications:  Medications - No data to display      Patient presents emergency department for evaluation of right hip pain after fall on stairs and possible syncopal event. Patient is alert and oriented no acute distress. Vitals are stable and she is afebrile. Patient has tenderness to palpation over the musculature of the right-sided lower back just above the sacroiliac joint. Patient has no pain with leg roll bilaterally. Normal strength and coordination to plantarflexion and dorsiflexion. No pain with flexion extension at the right knee. No pain to internal or external rotation of the right hip. No overlying bruising abrasion or deformity. Given the patient's report of syncope CT scan is ordered. It is negative for evidence of acute bony fracture or intracranial bleed. X-ray of the patient's chest, lumbar spine and pelvis are also negative for evidence of acute bony fracture, pneumonia, pneumothorax. Laboratory evaluation is pending. This is the enema shift care be turned over to Dr. Yany Mcguire. Please see his note for final disposition and care. FINAL IMPRESSION      1. Acute right hip pain    2. Near syncope          DISPOSITION/PLAN   DISPOSITION Decision To Discharge 12/05/2020 10:20:45 AM      PATIENT REFERREDTO:  Dario Loyola MD  [address]  [phone]    Call in 2 days  See your doctor Monday for recheck. Return if worse. Take over-the-counter medications for the pain.       DISCHARGE MEDICATIONS:  Discharge Medication List as of 12/5/2020 10:26 AM          DISCONTINUED MEDICATIONS:  Discharge Medication List as of 12/5/2020 10:26 AM                 (Please note that portions of this note were completed with a voice recognition program.  Efforts were made to edit the dictations but occasionally words are mis-transcribed.)    Sindy King PA-C (electronically signed)            Sindy King PA-C  12/06/20   [address], PA-C  12/14/20 [id number]

## 2020-12-05 NOTE — ED NOTES
Pt ambulated to restroom with writer for urine spec. Lab at bedside.      Anais Gonzalez RN  12/05/20 [id number]

## 2020-12-05 NOTE — ED NOTES
Lab recalled for purple top, states \"oh you want us to come draw that? \" was called at 0530 per nightshift for same.      Ottoniel Blanc RN  12/05/20 [id number]

## 2020-12-07 ENCOUNTER — TELEPHONE (OUTPATIENT)
Dept: PHARMACY | Age: 70
End: 2020-12-07

## 2020-12-07 NOTE — TELEPHONE ENCOUNTER
Returned call to patient. She states that she received script for prednisone 10 mg tablets, 5 tabs x2 days, 4 tabs x 2 days, 3 tabs x 2 days, 2 tabs x 2 days, 1 tab x 2 days then finish. Advised for patient to take 1 mg on Wed 12/9 rather than 2 mg then return to normal weekly dose of warfarin. R/s pt to RTC 1 week, 12/15.

## 2020-12-15 ENCOUNTER — ANTI-COAG VISIT (OUTPATIENT)
Dept: PHARMACY | Age: 70
End: 2020-12-15
Payer: MEDICARE

## 2020-12-15 VITALS — TEMPERATURE: 96.4 F

## 2020-12-15 LAB — INTERNATIONAL NORMALIZATION RATIO, POC: 4.4

## 2020-12-15 PROCEDURE — 85610 PROTHROMBIN TIME: CPT

## 2020-12-15 PROCEDURE — 99212 OFFICE O/P EST SF 10 MIN: CPT

## 2020-12-15 NOTE — PROGRESS NOTES
Ms. Radha Gomes is a 65 y.o. y/o female with history of Afib Diagnosed 4/10/14. Started warfarin 4/11/15   She presents today for anticoagulation monitoring and adjustment. Pertinent PMH:Had a CV 7/10/15 but is back in a-fib. Patient Reported Findings:  Yes     No  [x]   []       Patient verifies current dosing regimen as listed   []   [x]       S/S bleeding/bruising/swelling/SOB--> bruising from fall 12/5  []   [x]       Blood in urine or stool  []   [x]       Procedures scheduled in the future at this time  []   [x]       Missed dose   []   [x]       Extra dose  [x]   []       Change in medications - prednisone x 10 days 12/7    []   [x]       Change in health/diet/appetite -possibly a bit more fresh vegetables --> no changes--> no change no NVD  []   [x]       Change in alcohol use  []   [x]       Change in activity  []   [x]       Hospital admission  []   [x]       Emergency department visit-Fell and syncope 12/5, no INR taken, discharged home  []   [x]       Other complaints    Clinical Outcomes:  Yes     No  []   [x]       Major bleeding event  []   [x]       Thromboembolic event    Duration of warfarin Therapy: indefinitely  INR Range:  2.0-3.0     INR is 4.4 today d/t prednisone  Hold dose tonight then continue weekly dose of warfarin 1mg on Mon & Fri and 2mg all other days. Encouraged to maintain a consistency of vegetables/salads.   Called in refill to outpatient pharmacy  Recheck INR in 3 weeks, 1/6  Return to 6 week appointment when appropriate  **Refused AVS and apt card**    Referring PCP is Dr. Nicanor Morton  INR (no units)   Date Value   12/15/2020 4.4   10/26/2020 2.5   09/14/2020 2.6   07/27/2020 2.4   09/05/2018 3.4   07/23/2018 2.5   06/11/2018 2.9   05/01/2018 1.9     CLINICAL PHARMACY CONSULT: MED RECONCILIATION/REVIEW ADDENDUM    For Pharmacy Admin Tracking Only    PHSO: No  Total # of Interventions Recommended: 2  - Decreased Dose #: 1  - Refills Provided #: 1 - Maintenance Safety Lab Monitoring #: 1  Total Interventions Accepted: 2  Time Spent (min): [address], PharmD

## 2020-12-15 NOTE — TELEPHONE ENCOUNTER
Warfarin prescription phoned into [facility] [address] to [address] under Dr. Evans Lima  Warfarin 2 mg tabs  Take 1mg Mon Fri and 2mg all other days  90 days   2 refills

## 2021-01-06 ENCOUNTER — ANTI-COAG VISIT (OUTPATIENT)
Dept: PHARMACY | Age: 71
End: 2021-01-06
Payer: MEDICARE

## 2021-01-06 VITALS — TEMPERATURE: 96 F

## 2021-01-06 DIAGNOSIS — I48.91 ATRIAL FIBRILLATION, UNSPECIFIED TYPE (HCC): ICD-10-CM

## 2021-01-06 LAB — INTERNATIONAL NORMALIZATION RATIO, POC: 2.8

## 2021-01-06 PROCEDURE — 85610 PROTHROMBIN TIME: CPT

## 2021-01-06 PROCEDURE — 99211 OFF/OP EST MAY X REQ PHY/QHP: CPT

## 2021-01-06 NOTE — PROGRESS NOTES
Ms. Geoffrey Lambert is a 65 y.o. y/o female with history of Afib Diagnosed 4/10/14. Started warfarin 4/11/15   She presents today for anticoagulation monitoring and adjustment. Pertinent PMH:Had a CV 7/10/15 but is back in a-fib. Patient Reported Findings:  Yes     No  [x]   []       Patient verifies current dosing regimen as listed   []   [x]       S/S bleeding/bruising/swelling/SOB--> bruising from fall 12/5  []   [x]       Blood in urine or stool  []   [x]       Procedures scheduled in the future at this time  []   [x]       Missed dose   []   [x]       Extra dose  []   [x]       Change in medications - prednisone x 10 days 12/7--> no changes  []   [x]       Change in health/diet/appetite -possibly a bit more fresh vegetables --> no changes--> no change no NVD  []   [x]       Change in alcohol use  []   [x]       Change in activity  []   [x]       Hospital admission  []   [x]       Emergency department visit-Fell and syncope 12/5, no INR taken, discharged home  []   [x]       Other complaints    Clinical Outcomes:  Yes     No  []   [x]       Major bleeding event  []   [x]       Thromboembolic event    Duration of warfarin Therapy: indefinitely  INR Range:  2.0-3.0     INR is 2.8 today  Continue weekly dose of warfarin 1mg on Mon & Fri and 2mg all other days. Encouraged to maintain a consistency of vegetables/salads.   Called in refill to outpatient pharmacy last visit  Recheck INR in 6 weeks, 2/17    **Refused AVS and apt card**    Referring PCP is Dr. Thiago Valderrama  INR (no units)   Date Value   01/06/2021 2.8   12/15/2020 4.4   10/26/2020 2.5   09/14/2020 2.6   09/05/2018 3.4   07/23/2018 2.5   06/11/2018 2.9   05/01/2018 1.9     CLINICAL PHARMACY CONSULT: MED RECONCILIATION/REVIEW ADDENDUM    For Pharmacy Admin Tracking Only    PHSO: No  Total # of Interventions Recommended: 1  - Maintenance Safety Lab Monitoring #: 1  Total Interventions Accepted: 1  Time Spent (min): [address], PharmD

## 2021-02-17 ENCOUNTER — IMMUNIZATION (OUTPATIENT)
Dept: PRIMARY CARE CLINIC | Age: 71
End: 2021-02-17
Payer: MEDICARE

## 2021-02-17 ENCOUNTER — ANTI-COAG VISIT (OUTPATIENT)
Dept: PHARMACY | Age: 71
End: 2021-02-17
Payer: MEDICARE

## 2021-02-17 DIAGNOSIS — I48.91 ATRIAL FIBRILLATION, UNSPECIFIED TYPE (HCC): ICD-10-CM

## 2021-02-17 LAB — INTERNATIONAL NORMALIZATION RATIO, POC: 3.1

## 2021-02-17 PROCEDURE — 0011A COVID-19, MODERNA VACCINE 100MCG/0.5ML DOSE: CPT | Performed by: FAMILY MEDICINE

## 2021-02-17 PROCEDURE — 85610 PROTHROMBIN TIME: CPT

## 2021-02-17 PROCEDURE — 91301 COVID-19, MODERNA VACCINE 100MCG/0.5ML DOSE: CPT | Performed by: FAMILY MEDICINE

## 2021-02-17 PROCEDURE — 99211 OFF/OP EST MAY X REQ PHY/QHP: CPT

## 2021-02-17 NOTE — PROGRESS NOTES
Maintenance Safety Lab Monitoring #: 1  Total Interventions Accepted: 1  Time Spent (min): [address], PharmD

## 2021-03-17 ENCOUNTER — IMMUNIZATION (OUTPATIENT)
Dept: PRIMARY CARE CLINIC | Age: 71
End: 2021-03-17
Payer: MEDICARE

## 2021-03-17 PROCEDURE — 0012A COVID-19, MODERNA VACCINE 100MCG/0.5ML DOSE: CPT | Performed by: FAMILY MEDICINE

## 2021-03-17 PROCEDURE — 91301 COVID-19, MODERNA VACCINE 100MCG/0.5ML DOSE: CPT | Performed by: FAMILY MEDICINE

## 2021-03-31 ENCOUNTER — ANTI-COAG VISIT (OUTPATIENT)
Dept: PHARMACY | Age: 71
End: 2021-03-31
Payer: MEDICARE

## 2021-03-31 DIAGNOSIS — I48.91 ATRIAL FIBRILLATION, UNSPECIFIED TYPE (HCC): ICD-10-CM

## 2021-03-31 LAB — INTERNATIONAL NORMALIZATION RATIO, POC: 2.3

## 2021-03-31 PROCEDURE — 99211 OFF/OP EST MAY X REQ PHY/QHP: CPT

## 2021-03-31 PROCEDURE — 85610 PROTHROMBIN TIME: CPT

## 2021-03-31 NOTE — PROGRESS NOTES
Ms. Neil Matos is a 66 y.o. y/o female with history of Afib Diagnosed 4/10/14. Started warfarin 4/11/15   She presents today for anticoagulation monitoring and adjustment. Pertinent PMH:Had a CV 7/10/15 but is back in a-fib. Patient Reported Findings:  Yes     No  [x]   []       Patient verifies current dosing regimen as listed   []   [x]       S/S bleeding/bruising/swelling/SOB--> bruising from fall 12/5  []   [x]       Blood in urine or stool  []   [x]       Procedures scheduled in the future at this time  []   [x]       Missed dose   []   [x]       Extra dose  []   [x]       Change in medications - prednisone x 10 days 12/7--> no changes  []   [x]       Change in health/diet/appetite -possibly a bit more fresh vegetables --> no changes--> no change no NVD--> less greens lately, no NVD  []   [x]       Change in alcohol use  []   [x]       Change in activity  []   [x]       Hospital admission  []   [x]       Emergency department visit-Fell and syncope 12/5, no INR taken, discharged home  []   [x]       Other complaints    Clinical Outcomes:  Yes     No  []   [x]       Major bleeding event  []   [x]       Thromboembolic event    Duration of warfarin Therapy: indefinitely  INR Range:  2.0-3.0     INR is 2.3 today  Continue weekly dose of 1mg on Mon & Fri and 2mg all other days. Encouraged to maintain a consistency of vegetables/salads.   Recheck INR in 6 weeks, 5/12    **Refused AVS and apt card**    Referring PCP is Dr. Ravindra Smith  INR (no units)   Date Value   02/17/2021 3.1   01/06/2021 2.8   12/15/2020 4.4   10/26/2020 2.5   09/05/2018 3.4   07/23/2018 2.5   06/11/2018 2.9   05/01/2018 1.9     CLINICAL PHARMACY CONSULT: MED RECONCILIATION/REVIEW ADDENDUM    For Pharmacy Admin Tracking Only    PHSO: No  Total # of Interventions Recommended: 0  - Maintenance Safety Lab Monitoring #: 1  Total Interventions Accepted: 0  Time Spent (min): [address], PharmD

## 2021-05-12 ENCOUNTER — ANTI-COAG VISIT (OUTPATIENT)
Dept: PHARMACY | Age: 71
End: 2021-05-12
Payer: MEDICARE

## 2021-05-12 DIAGNOSIS — I48.91 ATRIAL FIBRILLATION, UNSPECIFIED TYPE (HCC): ICD-10-CM

## 2021-05-12 LAB — INTERNATIONAL NORMALIZATION RATIO, POC: 2.2

## 2021-05-12 PROCEDURE — 85610 PROTHROMBIN TIME: CPT

## 2021-05-12 PROCEDURE — 99211 OFF/OP EST MAY X REQ PHY/QHP: CPT

## 2021-05-12 NOTE — PROGRESS NOTES
Ms. Bala Nickerson is a 66 y.o. y/o female with history of Afib Diagnosed 4/10/14. Started warfarin 4/11/15   She presents today for anticoagulation monitoring and adjustment. Pertinent PMH:Had a CV 7/10/15 but is back in a-fib. Patient Reported Findings:  Yes     No  [x]   []       Patient verifies current dosing regimen as listed   []   [x]       S/S bleeding/bruising/swelling/SOB--> bruising from fall 12/5  []   [x]       Blood in urine or stool  []   [x]       Procedures scheduled in the future at this time  []   [x]       Missed dose   []   [x]       Extra dose  []   [x]       Change in medications - prednisone x 10 days 12/7--> cortisone shot in shoulder  []   [x]       Change in health/diet/appetite -possibly a bit more fresh vegetables --> no changes--> no change no NVD--> less greens lately, no NVD  []   [x]       Change in alcohol use  []   [x]       Change in activity  []   [x]       Hospital admission  []   [x]       Emergency department visit-Fell and syncope 12/5, no INR taken, discharged home  []   [x]       Other complaints    Clinical Outcomes:  Yes     No  []   [x]       Major bleeding event  []   [x]       Thromboembolic event    Duration of warfarin Therapy: indefinitely  INR Range:  2.0-3.0     INR is 2.2 today  Continue weekly dose of 1mg on Mon & Fri and 2mg all other days. Encouraged to maintain a consistency of vegetables/salads.   Recheck INR in 6 weeks, 6/23    **Refused AVS and apt card**    Referring PCP is Dr. Azell Moritz  INR (no units)   Date Value   05/12/2021 2.2   03/31/2021 2.3   02/17/2021 3.1   01/06/2021 2.8   09/05/2018 3.4   07/23/2018 2.5   06/11/2018 2.9   05/01/2018 1.9     For Pharmacy Admin Tracking Only     Total # of Interventions Recommended: 0   Total # of Interventions Accepted: 0   Time Spent (min): [address], PharmD

## 2021-06-23 ENCOUNTER — ANTI-COAG VISIT (OUTPATIENT)
Dept: PHARMACY | Age: 71
End: 2021-06-23
Payer: MEDICARE

## 2021-06-23 DIAGNOSIS — I48.91 ATRIAL FIBRILLATION, UNSPECIFIED TYPE (HCC): Primary | ICD-10-CM

## 2021-06-23 LAB — INTERNATIONAL NORMALIZATION RATIO, POC: 2.5

## 2021-06-23 PROCEDURE — 99211 OFF/OP EST MAY X REQ PHY/QHP: CPT

## 2021-06-23 PROCEDURE — 85610 PROTHROMBIN TIME: CPT

## 2021-06-23 NOTE — PROGRESS NOTES
Ms. Niranjan Morales is a 66 y.o. y/o female with history of Afib Diagnosed 4/10/14. Started warfarin 4/11/15   She presents today for anticoagulation monitoring and adjustment. Pertinent PMH:Had a CV 7/10/15 but is back in a-fib. Patient Reported Findings:  Yes     No  [x]   []       Patient verifies current dosing regimen as listed   []   [x]       S/S bleeding/bruising/swelling/SOB  []   [x]       Blood in urine or stool  []   [x]       Procedures scheduled in the future at this time  []   [x]       Missed dose   []   [x]       Extra dose  []   [x]       Change in medications - prednisone x 10 days 12/7--> cortisone shot in shoulder --> no changes  [x]   []       Change in health/diet/appetite -possibly a bit more fresh vegetables --> no changes--> no change no NVD--> less greens lately --> more greens, no NVD  []   [x]       Change in alcohol use  []   [x]       Change in activity  []   [x]       Hospital admission  []   [x]       Emergency department visit-Fell and syncope 12/5, no INR taken, discharged home  []   [x]       Other complaints    Clinical Outcomes:  Yes     No  []   [x]       Major bleeding event  []   [x]       Thromboembolic event    Duration of warfarin Therapy: indefinitely  INR Range:  2.0-3.0     INR is 2.5 today  Continue weekly dose of 1mg on Mon & Fri and 2mg all other days. Encouraged to maintain a consistency of vegetables/salads.   Recheck INR in 6 weeks, 8/4    **Refused AVS and apt card**    Referring PCP is Dr. Anibal Patel  INR (no units)   Date Value   05/12/2021 2.2   03/31/2021 2.3   02/17/2021 3.1   01/06/2021 2.8   09/05/2018 3.4   07/23/2018 2.5   06/11/2018 2.9   05/01/2018 1.9     For Pharmacy Admin Tracking Only     Total # of Interventions Recommended: 0   Total # of Interventions Accepted: 0   Time Spent (min): [address] [facility], PharmD

## 2021-08-04 ENCOUNTER — ANTI-COAG VISIT (OUTPATIENT)
Dept: PHARMACY | Age: 71
End: 2021-08-04
Payer: MEDICARE

## 2021-08-04 DIAGNOSIS — I48.91 ATRIAL FIBRILLATION, UNSPECIFIED TYPE (HCC): Primary | ICD-10-CM

## 2021-08-04 LAB — INTERNATIONAL NORMALIZATION RATIO, POC: 2.7

## 2021-08-04 PROCEDURE — 85610 PROTHROMBIN TIME: CPT

## 2021-08-04 PROCEDURE — 99211 OFF/OP EST MAY X REQ PHY/QHP: CPT

## 2021-08-04 NOTE — PROGRESS NOTES
Ms. Diogo Dominguez is a 66 y.o. y/o female with history of Afib Diagnosed 4/10/14. Started warfarin 4/11/15   She presents today for anticoagulation monitoring and adjustment. Pertinent PMH:Had a CV 7/10/15 but is back in a-fib. Patient Reported Findings:  Yes     No  [x]   []       Patient verifies current dosing regimen as listed   []   [x]       S/S bleeding/bruising/swelling/SOB  []   [x]       Blood in urine or stool  []   [x]       Procedures scheduled in the future at this time  []   [x]       Missed dose   []   [x]       Extra dose  [x]   []       Change in medications - prednisone x 10 days 12/7--> cortisone shot in shoulder --> no changes --> cipro started 8/2 for 5 days ending 8/6  [x]   []       Change in health/diet/appetite -possibly a bit more fresh vegetables --> no changes--> no change no NVD--> less greens lately --> more greens, no NVD --> greens 4 times in last week, no NVD  []   [x]       Change in alcohol use  []   [x]       Change in activity  []   [x]       Hospital admission  []   [x]       Emergency department visit-Fell and syncope 12/5, no INR taken, discharged home  []   [x]       Other complaints    Clinical Outcomes:  Yes     No  []   [x]       Major bleeding event  []   [x]       Thromboembolic event    Duration of warfarin Therapy: indefinitely  INR Range:  2.0-3.0     INR is 2.7 today d/t starting cipro but also eating more greens. Will continue dose since already in the half way point of abx therapy. Continue weekly dose of 1mg on Mon & Fri and 2mg all other days. Encouraged to maintain a consistency of vegetables/salads. Recheck INR in 7 weeks, 9/22 per pt preference to help coordinate better with medical care for her .      **Refused AVS and apt card**    Referring PCP is Dr. Katherine Matson  INR (no units)   Date Value   08/04/2021 2.7   06/23/2021 2.5   05/12/2021 2.2   03/31/2021 2.3   09/05/2018 3.4   07/23/2018 2.5   06/11/2018 2.9   05/01/2018 1.9     For Pharmacy Admin Tracking Only     Total # of Interventions Recommended: 0   Total # of Interventions Accepted: 0   Time Spent (min): [address] [place] [address] [facility] [place], PharmD

## 2021-09-22 ENCOUNTER — ANTI-COAG VISIT (OUTPATIENT)
Dept: PHARMACY | Age: 71
End: 2021-09-22
Payer: MEDICARE

## 2021-09-22 DIAGNOSIS — I48.91 ATRIAL FIBRILLATION, UNSPECIFIED TYPE (HCC): Primary | ICD-10-CM

## 2021-09-22 LAB — INTERNATIONAL NORMALIZATION RATIO, POC: 2.6

## 2021-09-22 PROCEDURE — 99211 OFF/OP EST MAY X REQ PHY/QHP: CPT

## 2021-09-22 PROCEDURE — 85610 PROTHROMBIN TIME: CPT

## 2021-09-22 NOTE — PROGRESS NOTES
Ms. Ranjit Perry is a 66 y.o. y/o female with history of Afib Diagnosed 4/10/14. Started warfarin 4/11/15   She presents today for anticoagulation monitoring and adjustment. Pertinent PMH:Had a CV 7/10/15 but is back in a-fib. Patient Reported Findings:  Yes     No  [x]   []       Patient verifies current dosing regimen as listed   []   [x]       S/S bleeding/bruising/swelling/SOB  []   [x]       Blood in urine or stool  []   [x]       Procedures scheduled in the future at this time  []   [x]       Missed dose   []   [x]       Extra dose  []   [x]       Change in medications - prednisone x 10 days 12/7--> cortisone shot in shoulder --> no changes --> cipro started 8/2 for 5 days ending 8/6--> no changes   [x]   []       Change in health/diet/appetite -possibly a bit more fresh vegetables --> no changes--> no change no NVD--> less greens lately --> more greens, no NVD --> greens 4 times in last week, no NVD--> less green (2-3x/week)  []   [x]       Change in alcohol use  []   [x]       Change in activity  []   [x]       Hospital admission  []   [x]       Emergency department visit-Fell and syncope 12/5, no INR taken, discharged home  []   [x]       Other complaints    Clinical Outcomes:  Yes     No  []   [x]       Major bleeding event  []   [x]       Thromboembolic event    Duration of warfarin Therapy: indefinitely  INR Range:  2.0-3.0     INR is 2.6 today    Continue weekly dose of 1mg on Mon & Fri and 2mg all other days. Encouraged to maintain a consistency of vegetables/salads. Recheck INR in 6.5 weeks, 11/8 per pt preference to help coordinate better with medical care for her .      **Refused AVS and apt card**     Referring PCP is Dr. Jose Rafael Garcia  INR (no units)   Date Value   08/04/2021 2.7   06/23/2021 2.5   05/12/2021 2.2   03/31/2021 2.3   09/05/2018 3.4   07/23/2018 2.5   06/11/2018 2.9   05/01/2018 1.9     For Pharmacy Admin Tracking Only     Total # of Interventions Recommended: 0   Total # of Interventions Accepted: 0   Time Spent (min): [address], [facility], PharmD

## 2021-10-15 ENCOUNTER — CLINICAL DOCUMENTATION (OUTPATIENT)
Dept: OTHER | Age: 71
End: 2021-10-15

## 2021-11-04 NOTE — TELEPHONE ENCOUNTER
Warfarin prescription phoned into [facility] 24 to [address] [facility] under Dr. Tracy Sifuentes  Warfarin 2 mg tabs  Take 1 mg (2 mg x 0.5) every Mon, Fri; 2 mg (2 mg x 1) all other days  90 days   2 refills    Milly Maki, PharmD, [facility] Tracking Only     Intervention Detail: Refill(s) Provided   Total # of Interventions Recommended: 1   Total # of Interventions Accepted: 1   Time Spent (min): 15

## 2021-11-08 ENCOUNTER — ANTI-COAG VISIT (OUTPATIENT)
Dept: PHARMACY | Age: 71
End: 2021-11-08
Payer: MEDICARE

## 2021-11-08 DIAGNOSIS — I48.91 ATRIAL FIBRILLATION, UNSPECIFIED TYPE (HCC): Primary | ICD-10-CM

## 2021-11-08 LAB — INTERNATIONAL NORMALIZATION RATIO, POC: 3.1

## 2021-11-08 PROCEDURE — 99211 OFF/OP EST MAY X REQ PHY/QHP: CPT

## 2021-11-08 PROCEDURE — 85610 PROTHROMBIN TIME: CPT

## 2021-11-08 NOTE — PROGRESS NOTES
Ms. Pancho Palma is a 66 y.o. y/o female with history of Afib Diagnosed 4/10/14. Started warfarin 4/11/15   She presents today for anticoagulation monitoring and adjustment. Pertinent PMH:Had a CV 7/10/15 but is back in a-fib. Patient Reported Findings:  Yes     No  [x]   []       Patient verifies current dosing regimen as listed   []   [x]       S/S bleeding/bruising/swelling/SOB  []   [x]       Blood in urine or stool  []   [x]       Procedures scheduled in the future at this time  []   [x]       Missed dose   []   [x]       Extra dose  []   [x]       Change in medications - no changes   [x]   []       Change in health/diet/appetite -possibly a bit more fresh vegetables --> no changes--> no change no NVD--> less greens lately --> more greens, no NVD --> greens 4 times in last week, no NVD--> less green (2-3x/week) --> less vit k acutely   []   [x]       Change in alcohol use  []   [x]       Change in activity  []   [x]       Hospital admission  []   [x]       Emergency department visit-Fell and syncope 12/5, no INR taken, discharged home  []   [x]       Other complaints    Clinical Outcomes:  Yes     No  []   [x]       Major bleeding event  []   [x]       Thromboembolic event    Duration of warfarin Therapy: indefinitely  INR Range:  2.0-3.0     INR is 3.1 today    Continue weekly dose of 1mg on Mon & Fri and 2mg all other days. Encouraged to maintain a consistency of vegetables/salads.   Recheck INR in 6 weeks, 12/20    **Refused AVS and apt card**    Patient consent signed 11/8/21     Referring PCP is Dr. Jose J Gomes  INR (no units)   Date Value   09/22/2021 2.6   08/04/2021 2.7   06/23/2021 2.5   05/12/2021 2.2   09/05/2018 3.4   07/23/2018 2.5   06/11/2018 2.9   05/01/2018 1.9     For Pharmacy Admin Tracking Only     Total # of Interventions Recommended: 0   Total # of Interventions Accepted: 0   Time Spent (min): Jaime [address], [facility], PharmD

## 2021-12-20 ENCOUNTER — ANTI-COAG VISIT (OUTPATIENT)
Dept: PHARMACY | Age: 71
End: 2021-12-20
Payer: MEDICARE

## 2021-12-20 DIAGNOSIS — I48.91 ATRIAL FIBRILLATION, UNSPECIFIED TYPE (HCC): Primary | ICD-10-CM

## 2021-12-20 LAB — INTERNATIONAL NORMALIZATION RATIO, POC: 2.9

## 2021-12-20 PROCEDURE — 99211 OFF/OP EST MAY X REQ PHY/QHP: CPT

## 2021-12-20 PROCEDURE — 85610 PROTHROMBIN TIME: CPT

## 2022-01-04 ENCOUNTER — TELEPHONE (OUTPATIENT)
Dept: ORTHOPEDIC SURGERY | Age: 72
End: 2022-01-04

## 2022-01-04 NOTE — TELEPHONE ENCOUNTER
LVM for patient regarding the [address] [place] [address] Orthopedic joint pain program. Patient can call [phone] for more information or to schedule an appointment with a joint pain specialist.

## 2022-01-20 ENCOUNTER — TELEPHONE (OUTPATIENT)
Dept: PHARMACY | Age: 72
End: 2022-01-20

## 2022-01-20 NOTE — TELEPHONE ENCOUNTER
----- Message from Kimberly Weiner sent at 1/20/2022  9:01 AM EST -----  Regarding: please call  Contact: patient  Patient accidentally doubled her warfarin last night. Please call her back @ [phone]  Leaving @ 12noon today.

## 2022-01-20 NOTE — TELEPHONE ENCOUNTER
Returned call to patient. Advised for patient to take 1 mg tonight rather than 2 mg then return to normal dose.  Will not hold dose since having TRESSA today at [facility].

## 2022-01-31 ENCOUNTER — ANTI-COAG VISIT (OUTPATIENT)
Dept: PHARMACY | Age: 72
End: 2022-01-31
Payer: MEDICARE

## 2022-01-31 DIAGNOSIS — I48.91 ATRIAL FIBRILLATION, UNSPECIFIED TYPE (HCC): Primary | ICD-10-CM

## 2022-01-31 LAB — INTERNATIONAL NORMALIZATION RATIO, POC: 3.1

## 2022-01-31 PROCEDURE — 99211 OFF/OP EST MAY X REQ PHY/QHP: CPT

## 2022-01-31 PROCEDURE — 85610 PROTHROMBIN TIME: CPT

## 2022-01-31 RX ORDER — METOPROLOL SUCCINATE 25 MG/1
25 TABLET, EXTENDED RELEASE ORAL DAILY
COMMUNITY

## 2022-01-31 NOTE — PROGRESS NOTES
Ms. Mendel Clark is a 66 y.o. y/o female with history of Afib Diagnosed 4/10/14. Started warfarin 4/11/15   She presents today for anticoagulation monitoring and adjustment. Pertinent PMH:Had a CV 7/10/15 but is back in a-fib. Patient Reported Findings:  Yes     No  [x]   []       Patient verifies current dosing regimen as listed   []   [x]       S/S bleeding/bruising/swelling/SOB  []   [x]       Blood in urine or stool  []   [x]       Procedures scheduled in the future at this time  []   [x]       Missed dose - 1mg 1/20  []   [x]       Extra dose - 4mg 1/19  []   [x]       Change in medications - no changes --> taking toprol XL 25mg  []   [x]       Change in health/diet/appetite -possibly a bit more fresh vegetables --> no changes--> no change no NVD--> less greens lately --> more greens, no NVD --> greens 4 times in last week, no NVD--> less green (2-3x/week) --> less vit k acutely --> continues with low greens, no NV, some diarrhea   []   [x]       Change in alcohol use  []   [x]       Change in activity  []   [x]       Hospital admission  []   [x]       Emergency department visit-   []   [x]       Other complaints    Clinical Outcomes:  Yes     No  []   [x]       Major bleeding event  []   [x]       Thromboembolic event    Duration of warfarin Therapy: indefinitely  INR Range:  2.0-3.0     INR is 3.1 today    Hold tonight then continue weekly dose of 1mg on Mon & Fri and 2mg all other days. Encouraged to maintain a consistency of vegetables/salads.   Recheck INR in 6 weeks, 3/14    **Refused AVS and apt card**    Patient consent signed 11/8/21     Referring PCP is Dr. Neal Boyer  INR (no units)   Date Value   12/20/2021 2.9   11/08/2021 3.1   09/22/2021 2.6   08/04/2021 2.7   09/05/2018 3.4   07/23/2018 2.5   06/11/2018 2.9   05/01/2018 1.9   For Pharmacy Admin Tracking Only     Intervention Detail: Dose Adjustment: 1, reason: Therapy Optimization   Total # of Interventions Recommended: 1   Total # of Interventions Accepted: 1   Time Spent (min): [address], [facility], PharmD

## 2022-03-14 ENCOUNTER — ANTI-COAG VISIT (OUTPATIENT)
Dept: PHARMACY | Age: 72
End: 2022-03-14
Payer: MEDICARE

## 2022-03-14 DIAGNOSIS — I48.91 ATRIAL FIBRILLATION, UNSPECIFIED TYPE (HCC): Primary | ICD-10-CM

## 2022-03-14 LAB — INTERNATIONAL NORMALIZATION RATIO, POC: 3.7

## 2022-03-14 PROCEDURE — 99212 OFFICE O/P EST SF 10 MIN: CPT

## 2022-03-14 PROCEDURE — 85610 PROTHROMBIN TIME: CPT

## 2022-03-14 NOTE — PROGRESS NOTES
Ms. Mendel Clark is a 66 y.o. y/o female with history of Afib Diagnosed 4/10/14. Started warfarin 4/11/15   She presents today for anticoagulation monitoring and adjustment. Pertinent PMH:Had a CV 7/10/15 but is back in a-fib. Patient Reported Findings:  Yes     No  [x]   []       Patient verifies current dosing regimen as listed   []   [x]       S/S bleeding/bruising/swelling/SOB  []   [x]       Blood in urine or stool  []   [x]       Procedures scheduled in the future at this time  []   [x]       Missed dose - 1mg 1/20  []   [x]       Extra dose - 4mg 1/19  []   [x]       Change in medications - no changes --> taking toprol XL 25mg  [x]   []       Change in health/diet/appetite -possibly a bit more fresh vegetables --> no changes--> no change no NVD--> less greens lately --> more greens, no NVD --> greens 4 times in last week, no NVD--> less green (2-3x/week) --> less vit k acutely --> continues with low greens, no NV, some diarrhea --> less vit k d/t price   []   [x]       Change in alcohol use  []   [x]       Change in activity  []   [x]       Hospital admission  []   [x]       Emergency department visit-   []   [x]       Other complaints    Clinical Outcomes:  Yes     No  []   [x]       Major bleeding event  []   [x]       Thromboembolic event    Duration of warfarin Therapy: indefinitely  INR Range:  2.0-3.0     INR is 3.7 today    Hold tonight then decrease weekly dose to 1mg on Mon Wed & Fri and 2mg all other days. (8% dec)  Encouraged to maintain a consistency of vegetables/salads.   Recheck INR in 3 weeks, 4/4  Return to 6 week appt when appropriate     **Refused AVS and apt card**    Patient consent signed 11/8/21     Referring PCP is Dr. Neal Boyer  INR (no units)   Date Value   01/31/2022 3.1   12/20/2021 2.9   11/08/2021 3.1   09/22/2021 2.6   09/05/2018 3.4   07/23/2018 2.5   06/11/2018 2.9   05/01/2018 1.9       For Pharmacy Admin Tracking Only     Intervention Detail: Dose Adjustment: 1, reason: Therapy Optimization   Total # of Interventions Recommended: 1   Total # of Interventions Accepted: 1   Time Spent (min): [address] [facility], [facility] [place], PharmD

## 2022-03-18 ENCOUNTER — TELEPHONE (OUTPATIENT)
Dept: PHARMACY | Age: 72
End: 2022-03-18

## 2022-03-18 NOTE — TELEPHONE ENCOUNTER
Faxed [facility] for completion and signature to patient's new PCP,  Dr. Manuelito Short's office.

## 2022-04-01 NOTE — TELEPHONE ENCOUNTER
Signed collaborative received from Dr. Mely Velazquez office. IT request entered to get 's information entered into Epic to update patient's file.

## 2022-04-04 ENCOUNTER — ANTI-COAG VISIT (OUTPATIENT)
Dept: PHARMACY | Age: 72
End: 2022-04-04
Payer: MEDICARE

## 2022-04-04 DIAGNOSIS — I48.91 ATRIAL FIBRILLATION, UNSPECIFIED TYPE (HCC): Primary | ICD-10-CM

## 2022-04-04 LAB — INTERNATIONAL NORMALIZATION RATIO, POC: 2.4

## 2022-04-04 PROCEDURE — 99211 OFF/OP EST MAY X REQ PHY/QHP: CPT

## 2022-04-04 PROCEDURE — 85610 PROTHROMBIN TIME: CPT

## 2022-04-04 NOTE — PROGRESS NOTES
Ms. Giles Vance is a 74 y.o. y/o female with history of Afib Diagnosed 4/10/14. Started warfarin 4/11/15   She presents today for anticoagulation monitoring and adjustment. Pertinent PMH:Had a CV 7/10/15 but is back in a-fib. Patient Reported Findings:  Yes     No  []   [x]       Patient verifies current dosing regimen as listed took lowered weekly dose for first week then returned to 1 mg on Mon and Fri and 2 mg all other days of the week   []   [x]       S/S bleeding/bruising/swelling/SOB  []   [x]       Blood in urine or stool  []   [x]       Procedures scheduled in the future at this time  []   [x]       Missed dose - denies   []   [x]       Extra dose - denies   []   [x]       Change in medications - no changes --> taking toprol XL 25mg  [x]   []       Change in health/diet/appetite -possibly a bit more fresh vegetables --> no changes--> no change no NVD--> less greens lately --> more greens, no NVD --> greens 4 times in last week, no NVD--> less green (2-3x/week) --> less vit k acutely --> continues with low greens, no NV, some diarrhea --> less vit k d/t price --> has been eating more vit k   []   [x]       Change in alcohol use  []   [x]       Change in activity  []   [x]       Hospital admission  []   [x]       Emergency department visit-   []   [x]       Other complaints    Clinical Outcomes:  Yes     No  []   [x]       Major bleeding event  []   [x]       Thromboembolic event    Duration of warfarin Therapy: indefinitely  INR Range:  2.0-3.0     INR is 2.4 today    Continue weekly dose of 1mg on Mon & Fri and 2mg all other days. Encouraged to maintain a consistency of vegetables/salads.   Recheck INR in 4 weeks, 5/2  Return to 6 week appt when appropriate     **Refused AVS and apt card**    Patient consent signed 11/8/21     Referring is Dr. Cristina Luna   INR (no units)   Date Value   03/14/2022 3.7   01/31/2022 3.1   12/20/2021 2.9   11/08/2021 3.1   09/05/2018 3.4   07/23/2018 2.5   06/11/2018 2.9 05/01/2018 1.9       For Pharmacy Admin Tracking Only     Total # of Interventions Recommended: 0   Total # of Interventions Accepted: 0   Time Spent (min): Jaime [address], [facility], PharmD

## 2022-05-02 ENCOUNTER — ANTI-COAG VISIT (OUTPATIENT)
Dept: PHARMACY | Age: 72
End: 2022-05-02
Payer: MEDICARE

## 2022-05-02 DIAGNOSIS — I48.91 ATRIAL FIBRILLATION, UNSPECIFIED TYPE (HCC): Primary | ICD-10-CM

## 2022-05-02 LAB — INTERNATIONAL NORMALIZATION RATIO, POC: 2.1

## 2022-05-02 PROCEDURE — 99211 OFF/OP EST MAY X REQ PHY/QHP: CPT

## 2022-05-02 PROCEDURE — 85610 PROTHROMBIN TIME: CPT

## 2022-05-02 NOTE — PROGRESS NOTES
Ms. Radha Gomes is a 74 y.o. y/o female with history of Afib Diagnosed 4/10/14. Started warfarin 4/11/15   She presents today for anticoagulation monitoring and adjustment. Pertinent PMH:Had a CV 7/10/15 but is back in a-fib. Patient Reported Findings:  Yes     No  []   [x]       Patient verifies current dosing regimen as listed took lowered weekly dose for first week then returned to 1 mg on Mon and Fri and 2 mg all other days of the week   []   [x]       S/S bleeding/bruising/swelling/SOB  []   [x]       Blood in urine or stool  []   [x]       Procedures scheduled in the future at this time  []   [x]       Missed dose - denies   []   [x]       Extra dose - denies   []   [x]       Change in medications - no changes    []   [x]       Change in health/diet/appetite -possibly a bit more fresh vegetables --> greens 4 times in last week, no NVD--> less green (2-3x/week) --> continues with low greens, no NV, some diarrhea --> less vit k d/t price --> has been eating more vit k --> no changes   []   [x]       Change in alcohol use  []   [x]       Change in activity  []   [x]       Hospital admission  []   [x]       Emergency department visit-   []   [x]       Other complaints    Clinical Outcomes:  Yes     No  []   [x]       Major bleeding event  []   [x]       Thromboembolic event    Duration of warfarin Therapy: indefinitely  INR Range:  2.0-3.0     INR is 2.1 today    Continue weekly dose of 1mg on Mon & Fri and 2mg all other days. Encouraged to maintain a consistency of vegetables/salads.   Recheck INR in 5 weeks, 6/6   Return to 6 week appt when appropriate     **Refused AVS and apt card**    Patient consent signed 11/8/21     Referring is Dr. Rick Leon   INR (no units)   Date Value   04/04/2022 2.4   03/14/2022 3.7   01/31/2022 3.1   12/20/2021 2.9   09/05/2018 3.4   07/23/2018 2.5   06/11/2018 2.9   05/01/2018 1.9       For Pharmacy Admin Tracking Only     Total # of Interventions Recommended: 0   Total # of Interventions Accepted: 0   Time Spent (min): Jaime [address], [facility], PharmD

## 2022-06-06 ENCOUNTER — ANTI-COAG VISIT (OUTPATIENT)
Dept: PHARMACY | Age: 72
End: 2022-06-06
Payer: MEDICARE

## 2022-06-06 DIAGNOSIS — I48.91 ATRIAL FIBRILLATION, UNSPECIFIED TYPE (HCC): Primary | ICD-10-CM

## 2022-06-06 LAB — INTERNATIONAL NORMALIZATION RATIO, POC: 2.8

## 2022-06-06 PROCEDURE — 99211 OFF/OP EST MAY X REQ PHY/QHP: CPT

## 2022-06-06 PROCEDURE — 85610 PROTHROMBIN TIME: CPT

## 2022-06-06 NOTE — PROGRESS NOTES
Ms. Norberto Tracy is a 74 y.o. y/o female with history of Afib Diagnosed 4/10/14. Started warfarin 4/11/15   She presents today for anticoagulation monitoring and adjustment. Pertinent PMH:Had a CV 7/10/15 but is back in a-fib. Patient Reported Findings:  Yes     No  [x]   []       Patient verifies current dosing regimen as listed took lowered weekly dose for first week then returned to 1 mg on Mon and Fri and 2 mg all other days of the week --> verified dose   []   [x]       S/S bleeding/bruising/swelling/SOB  []   [x]       Blood in urine or stool  []   [x]       Procedures scheduled in the future at this time  []   [x]       Missed dose - denies   []   [x]       Extra dose - denies   []   [x]       Change in medications - no changes    [x]   []       Change in health/diet/appetite -possibly a bit more fresh vegetables --> greens 4 times in last week, no NVD--> less green (2-3x/week) --> continues with low greens, no NV, some diarrhea --> less vit k d/t price --> has been eating more vit k --> had diarrhea for a few days 2 weeks ago. []   [x]       Change in alcohol use  []   [x]       Change in activity  []   [x]       Hospital admission  []   [x]       Emergency department visit-   []   [x]       Other complaints    Clinical Outcomes:  Yes     No  []   [x]       Major bleeding event  []   [x]       Thromboembolic event    Duration of warfarin Therapy: indefinitely  INR Range:  2.0-3.0     INR is 2.8 today    Continue weekly dose of 1mg on Mon & Fri and 2mg all other days. Encouraged to maintain a consistency of vegetables/salads.   Recheck INR in 6 weeks, 7/18     **Refused AVS and apt card**    Patient consent signed 11/8/21     Referring is Dr. Marii Toure   INR (no units)   Date Value   05/02/2022 2.1   04/04/2022 2.4   03/14/2022 3.7   01/31/2022 3.1   09/05/2018 3.4   07/23/2018 2.5   06/11/2018 2.9   05/01/2018 1.9       For Pharmacy Admin Tracking Only     Total # of Interventions Recommended: 0   Total # of Interventions Accepted: 0   Time Spent (min): Jaime [address], [facility], PharmD

## 2022-07-14 NOTE — TELEPHONE ENCOUNTER
Warfarin prescription phoned into [facility] [address] to [address] under Dr. Rebekah Troncoso  Warfarin 2 mg tabs  Take 1 mg (2 mg x 0.5) every Mon, Fri; 2 mg (2 mg x 1) all other days  90 days   2 refills    Milly Maki, EditaD, [facility] Tracking Only     Intervention Detail: Refill(s) Provided   Total # of Interventions Recommended: 1   Total # of Interventions Accepted: 1   Time Spent (min): 15

## 2022-07-18 ENCOUNTER — ANTI-COAG VISIT (OUTPATIENT)
Dept: PHARMACY | Age: 72
End: 2022-07-18
Payer: MEDICARE

## 2022-07-18 DIAGNOSIS — I48.91 ATRIAL FIBRILLATION, UNSPECIFIED TYPE (HCC): Primary | ICD-10-CM

## 2022-07-18 LAB — INTERNATIONAL NORMALIZATION RATIO, POC: 2.9

## 2022-07-18 PROCEDURE — 85610 PROTHROMBIN TIME: CPT

## 2022-07-18 PROCEDURE — 99211 OFF/OP EST MAY X REQ PHY/QHP: CPT

## 2022-07-18 NOTE — PROGRESS NOTES
Ms. Kenia Wilde is a 74 y.o. y/o female with history of Afib Diagnosed 4/10/14. Started warfarin 4/11/15   She presents today for anticoagulation monitoring and adjustment. Pertinent PMH:Had a CV 7/10/15 but is back in a-fib. Patient Reported Findings:  Yes     No  [x]   []       Patient verifies current dosing regimen as listed took lowered weekly dose for first week then returned to 1 mg on Mon and Fri and 2 mg all other days of the week --> verified dose   []   [x]       S/S bleeding/bruising/swelling/SOB  []   [x]       Blood in urine or stool  []   [x]       Procedures scheduled in the future at this time  []   [x]       Missed dose - denies   []   [x]       Extra dose - denies   []   [x]       Change in medications - no changes    []   [x]       Change in health/diet/appetite -possibly a bit more fresh vegetables --> greens 4 times in last week, no NVD--> less green (2-3x/week) --> continues with low greens, no NV, some diarrhea --> less vit k d/t price --> has been eating more vit k --> had diarrhea for a few days 2 weeks ago. --> no changes    []   [x]       Change in alcohol use  []   [x]       Change in activity  []   [x]       Hospital admission  []   [x]       Emergency department visit-   []   [x]       Other complaints    Clinical Outcomes:  Yes     No  []   [x]       Major bleeding event  []   [x]       Thromboembolic event    Duration of warfarin Therapy: indefinitely  INR Range:  2.0-3.0     INR is 2.9 today    Continue weekly dose of 1mg on Mon & Fri and 2mg all other days. Encouraged to maintain a consistency of vegetables/salads.   Recheck INR in 6 weeks, 8/29     **Refused AVS and apt card**    Patient consent signed 11/8/21     Referring is Dr. Constanza Mcdaniel   INR (no units)   Date Value   09/05/2018 3.4   07/23/2018 2.5   06/11/2018 2.9   05/01/2018 1.9     INR,(POC) (no units)   Date Value   06/06/2022 2.8   05/02/2022 2.1   04/04/2022 2.4   03/14/2022 3.7       For Pharmacy Admin Tracking Only    Total # of Interventions Recommended: 0  Total # of Interventions Accepted: 0  Time Spent (min): Jaime Maharaj, [facility], PharmD

## 2022-08-29 ENCOUNTER — ANTI-COAG VISIT (OUTPATIENT)
Dept: PHARMACY | Age: 72
End: 2022-08-29
Payer: MEDICARE

## 2022-08-29 DIAGNOSIS — I48.91 ATRIAL FIBRILLATION, UNSPECIFIED TYPE (HCC): Primary | ICD-10-CM

## 2022-08-29 LAB — INTERNATIONAL NORMALIZATION RATIO, POC: 2.4

## 2022-08-29 PROCEDURE — 85610 PROTHROMBIN TIME: CPT

## 2022-08-29 PROCEDURE — 99211 OFF/OP EST MAY X REQ PHY/QHP: CPT

## 2022-08-29 NOTE — PROGRESS NOTES
Ms. Dann Zhang is a 74 y.o. y/o female with history of Afib Diagnosed 4/10/14. Started warfarin 4/11/15   She presents today for anticoagulation monitoring and adjustment. Pertinent PMH:Had a CV 7/10/15 but is back in a-fib. Patient Reported Findings:  Yes     No  [x]   []       Patient verifies current dosing regimen as listed took lowered weekly dose for first week then returned to 1 mg on Mon and Fri and 2 mg all other days of the week --> verified dose   [x]   []       S/S bleeding/bruising/swelling/SOB --> more bruising than normal on legs, but pretty consistent with AC. Counseled to watch if get worse, spread, pain, swelling to call doctor/911  []   [x]       Blood in urine or stool  []   [x]       Procedures scheduled in the future at this time --> none  []   [x]       Missed dose - denies   []   [x]       Extra dose - denies   []   [x]       Change in medications - no changes    []   [x]       Change in health/diet/appetite -possibly a bit more fresh vegetables --> greens 4 times in last week, no NVD--> less green (2-3x/week) --> continues with low greens, no NV, some diarrhea --> less vit k d/t price --> has been eating more vit k --> had diarrhea for a few days 2 weeks ago. --> no changes, no NVD  []   [x]       Change in alcohol use  []   [x]       Change in activity  []   [x]       Hospital admission  []   [x]       Emergency department visit-   []   [x]       Other complaints    Clinical Outcomes:  Yes     No  []   [x]       Major bleeding event  []   [x]       Thromboembolic event    Duration of warfarin Therapy: indefinitely  INR Range:  2.0-3.0     INR is 2.4 today    Continue weekly dose of 1mg on Mon & Fri and 2mg all other days. Encouraged to maintain a consistency of vegetables/salads.     Recheck INR in 6 weeks, 10/10     **Refused AVS and apt card**    Patient consent signed 11/8/21     Referring is Dr. Marco Abrams   INR (no units)   Date Value   09/05/2018 3.4   07/23/2018 2.5 06/11/2018 2.9   05/01/2018 1.9     INR,(POC) (no units)   Date Value   07/18/2022 2.9   06/06/2022 2.8   05/02/2022 2.1   04/04/2022 2.4       For Pharmacy Admin Tracking Only    Total # of Interventions Recommended: 0  Total # of Interventions Accepted: 0  Time Spent (min): [address] Mary Marin [facility], PharmD

## 2022-10-10 ENCOUNTER — ANTI-COAG VISIT (OUTPATIENT)
Dept: PHARMACY | Age: 72
End: 2022-10-10
Payer: MEDICARE

## 2022-10-10 DIAGNOSIS — I48.91 ATRIAL FIBRILLATION, UNSPECIFIED TYPE (HCC): Primary | ICD-10-CM

## 2022-10-10 LAB — INTERNATIONAL NORMALIZATION RATIO, POC: 3.4

## 2022-10-10 PROCEDURE — 99211 OFF/OP EST MAY X REQ PHY/QHP: CPT

## 2022-10-10 PROCEDURE — 85610 PROTHROMBIN TIME: CPT

## 2022-10-10 NOTE — PROGRESS NOTES
Ms. Zena Litten is a 74 y.o. y/o female with history of Afib Diagnosed 4/10/14. Started warfarin 4/11/15   She presents today for anticoagulation monitoring and adjustment. Pertinent PMH:Had a CV 7/10/15 but is back in a-fib. Patient Reported Findings:  Yes     No  [x]   []       Patient verifies current dosing regimen as listed took lowered weekly dose for first week then returned to 1 mg on Mon and Fri and 2 mg all other days of the week --> verified dose   []   [x]       S/S bleeding/bruising/swelling/SOB --> more bruising than normal on legs, but pretty consistent with AC. Counseled to watch if get worse, spread, pain, swelling to call doctor/911 --> denies   []   [x]       Blood in urine or stool  []   [x]       Procedures scheduled in the future at this time --> none  []   [x]       Missed dose - denies   []   [x]       Extra dose - denies   []   [x]       Change in medications - no changes    [x]   []       Change in health/diet/appetite greens 4 times in last week, no NVD--> less green (2-3x/week) --> continues with low greens, no NV, some diarrhea --> had diarrhea for a few days 2 weeks ago. --> no changes, no NVD --> has had more vit k over the past few weeks   []   [x]       Change in alcohol use  []   [x]       Change in activity  []   [x]       Hospital admission  []   [x]       Emergency department visit-   []   [x]       Other complaints    Clinical Outcomes:  Yes     No  []   [x]       Major bleeding event  []   [x]       Thromboembolic event    Duration of warfarin Therapy: indefinitely  INR Range:  2.0-3.0     INR is 3.4 today    Hold dose tonight then continue weekly dose of 1mg on Mon & Fri and 2mg all other days. Encouraged to maintain a consistency of vegetables/salads.   Recheck INR in 4 weeks, 11/7  Retunr to 6 week appt when appropriate      **Refused AVS and apt card**    Patient consent signed 11/8/21     Referring is Dr. Lupe Ochoa   INR (no units)   Date Value   09/05/2018 3.4

## 2022-11-07 ENCOUNTER — ANTI-COAG VISIT (OUTPATIENT)
Dept: PHARMACY | Age: 72
End: 2022-11-07
Payer: MEDICARE

## 2022-11-07 DIAGNOSIS — I48.91 ATRIAL FIBRILLATION, UNSPECIFIED TYPE (HCC): Primary | ICD-10-CM

## 2022-11-07 LAB — INTERNATIONAL NORMALIZATION RATIO, POC: 3

## 2022-11-07 PROCEDURE — 99211 OFF/OP EST MAY X REQ PHY/QHP: CPT

## 2022-11-07 PROCEDURE — 85610 PROTHROMBIN TIME: CPT

## 2022-11-07 NOTE — PROGRESS NOTES
Ms. Sofiya Thakkar is a 74 y.o. y/o female with history of Afib Diagnosed 4/10/14. Started warfarin 4/11/15   She presents today for anticoagulation monitoring and adjustment. Pertinent PMH:Had a CV 7/10/15 but is back in a-fib. Patient Reported Findings:  Yes     No  [x]   []       Patient verifies current dosing regimen as listed took lowered weekly dose for first week then returned to 1 mg on Mon and Fri and 2 mg all other days of the week --> verified dose   []   [x]       S/S bleeding/bruising/swelling/SOB --> more bruising than normal on legs, but pretty consistent with AC. Counseled to watch if get worse, spread, pain, swelling to call doctor/911 --> denies   []   [x]       Blood in urine or stool  []   [x]       Procedures scheduled in the future at this time --> none  []   [x]       Missed dose - denies   []   [x]       Extra dose - denies   []   [x]       Change in medications - no changes    [x]   []       Change in health/diet/appetite greens 4 times in last week, no NVD--> less green (2-3x/week) --> continues with low greens, no NV, some diarrhea --> had diarrhea for a few days 2 weeks ago. --> no changes, no NVD --> has had more vit k over the past few weeks --> diet off d/t  having fall and needing care. Is returning to normal   []   [x]       Change in alcohol use  []   [x]       Change in activity  []   [x]       Hospital admission  []   [x]       Emergency department visit-   []   [x]       Other complaints    Clinical Outcomes:  Yes     No  []   [x]       Major bleeding event  []   [x]       Thromboembolic event    Duration of warfarin Therapy: indefinitely  INR Range:  2.0-3.0     INR is 3 today    Continue weekly dose of 1mg on Mon & Fri and 2mg all other days. Encouraged to maintain a consistency of vegetables/salads.   Recheck INR in 6 weeks, 12/19    **Refused AVS and apt card**    Patient consent signed 11/8/21     Referring is Dr. Bell Living   INR (no units)   Date Value 09/05/2018 3.4   07/23/2018 2.5   06/11/2018 2.9   05/01/2018 1.9     INR,(POC) (no units)   Date Value   10/10/2022 3.4   08/29/2022 2.4   07/18/2022 2.9   06/06/2022 2.8       For Pharmacy Admin Tracking Only    Total # of Interventions Recommended: 0  Total # of Interventions Accepted: 0  Time Spent (min): [address] Neff, [facility], PharmD

## 2022-12-19 ENCOUNTER — ANTI-COAG VISIT (OUTPATIENT)
Dept: PHARMACY | Age: 72
End: 2022-12-19
Payer: MEDICARE

## 2022-12-19 DIAGNOSIS — I48.91 ATRIAL FIBRILLATION, UNSPECIFIED TYPE (HCC): Primary | ICD-10-CM

## 2022-12-19 LAB — INTERNATIONAL NORMALIZATION RATIO, POC: 4.1

## 2022-12-19 PROCEDURE — 85610 PROTHROMBIN TIME: CPT

## 2022-12-19 PROCEDURE — 99212 OFFICE O/P EST SF 10 MIN: CPT

## 2022-12-19 PROCEDURE — 99211 OFF/OP EST MAY X REQ PHY/QHP: CPT

## 2022-12-19 NOTE — PROGRESS NOTES
Ms. Estephania Cortes is a 74 y.o. y/o female with history of Afib Diagnosed 4/10/14. Started warfarin 4/11/15   She presents today for anticoagulation monitoring and adjustment. Pertinent PMH:Had a CV 7/10/15 but is back in a-fib. Patient Reported Findings:  Yes     No  [x]   []       Patient verifies current dosing regimen as listed took lowered weekly dose for first week then returned to 1 mg on Mon and Fri and 2 mg all other days of the week --> verified dose   []   [x]       S/S bleeding/bruising/swelling/SOB --> more bruising than normal on legs, but pretty consistent with AC. Counseled to watch if get worse, spread, pain, swelling to call doctor/911 --> denies   []   [x]       Blood in urine or stool  []   [x]       Procedures scheduled in the future at this time --> none  []   [x]       Missed dose - denies   []   [x]       Extra dose - denies   []   [x]       Change in medications - no changes    [x]   []       Change in health/diet/appetite greens 4 times in last week, no NVD--> less green (2-3x/week) --> continues with low greens, no NV, some diarrhea --> had diarrhea for a few days 2 weeks ago. --> no changes, no NVD --> has had more vit k over the past few weeks --> diet off d/t  having fall and needing care. Is returning to normal--> usually 3-4x greens this past couple weeks has not been able to have consistent diet d/t to stress ( has cancer)  []   [x]       Change in alcohol use  []   [x]       Change in activity  []   [x]       Hospital admission  []   [x]       Emergency department visit-   []   [x]       Other complaints    Clinical Outcomes:  Yes     No  []   [x]       Major bleeding event  []   [x]       Thromboembolic event    Duration of warfarin Therapy: indefinitely  INR Range:  2.0-3.0     INR is 4.1 today d/t missed greens and stress from 's recent cancer diagnosis. Hold today's dose then take 1 mg tomorrow.  Then Continue weekly dose of 1mg on Mon & Fri and 2mg all other days. Encouraged to maintain a consistency of vegetables/salads.   Recheck INR in 2 weeks, 23    **Refused AVS and apt card**    Patient consent signed 21     Referring is Dr. Danish Choudhury   INR (no units)   Date Value   2018 3.4   2018 2.5   2018 2.9   2018 1.9     INR,(POC) (no units)   Date Value   2022 3.0   10/10/2022 3.4   2022 2.4   2022 2.9     For Pharmacy Admin Tracking Only    Intervention Detail: Adherence Monitorin and Dose Adjustment: 1, reason: Improve Adherence, Therapy Optimization  Total # of Interventions Recommended: 2  Total # of Interventions Accepted: 2  Time Spent (min): Pinky Morgan., [facility], PharmD

## 2023-01-04 ENCOUNTER — ANTI-COAG VISIT (OUTPATIENT)
Dept: PHARMACY | Age: 73
End: 2023-01-04
Payer: MEDICARE

## 2023-01-04 DIAGNOSIS — I48.91 ATRIAL FIBRILLATION, UNSPECIFIED TYPE (HCC): Primary | ICD-10-CM

## 2023-01-04 LAB — INTERNATIONAL NORMALIZATION RATIO, POC: 3

## 2023-01-04 PROCEDURE — 99211 OFF/OP EST MAY X REQ PHY/QHP: CPT

## 2023-01-04 PROCEDURE — 85610 PROTHROMBIN TIME: CPT

## 2023-01-04 NOTE — PROGRESS NOTES
Ms. Arturo Aquino is a 74 y.o. y/o female with history of Afib Diagnosed 4/10/14. Started warfarin 4/11/15   She presents today for anticoagulation monitoring and adjustment. Pertinent PMH:Had a CV 7/10/15 but is back in a-fib. Patient Reported Findings:  Yes     No  [x]   []       Patient verifies current dosing regimen as listed took lowered weekly dose for first week then returned to 1 mg on Mon and Fri and 2 mg all other days of the week --> verified dose   []   [x]       S/S bleeding/bruising/swelling/SOB --> more bruising than normal on legs, but pretty consistent with AC. Counseled to watch if get worse, spread, pain, swelling to call doctor/911 --> denies   []   [x]       Blood in urine or stool  []   [x]       Procedures scheduled in the future at this time --> none  []   [x]       Missed dose - denies   []   [x]       Extra dose - denies   []   [x]       Change in medications - no changes    [x]   []       Change in health/diet/appetite greens 4 times in last week, no NVD--> less green (2-3x/week) --> continues with low greens, no NV, some diarrhea --> had diarrhea for a few days 2 weeks ago. --> no changes, no NVD --> has had more vit k over the past few weeks --> diet off d/t  having fall and needing care. Is returning to normal--> usually 3-4x greens this past couple weeks has not been able to have consistent diet d/t to stress ( has cancer)---> back to normal diet she believes, no NVD  []   [x]       Change in alcohol use  []   [x]       Change in activity  []   [x]       Hospital admission  []   [x]       Emergency department visit-   []   [x]       Other complaints    Clinical Outcomes:  Yes     No  []   [x]       Major bleeding event  []   [x]       Thromboembolic event    Duration of warfarin Therapy: indefinitely  INR Range:  2.0-3.0     INR is 3.0 today   Continue weekly dose of 1mg on Mon & Fri and 2mg all other days.    Encouraged to maintain a consistency of vegetables/salads.   Recheck INR in 4 weeks, 1/30 then return to 6 weeks     **Refused AVS and apt card**    Patient consent signed 11/8/21     Referring is Dr. Marysol Sagastume   INR (no units)   Date Value   09/05/2018 3.4   07/23/2018 2.5   06/11/2018 2.9   05/01/2018 1.9     INR,(POC) (no units)   Date Value   01/04/2023 3.0   12/19/2022 4.1   11/07/2022 3.0   10/10/2022 3.4     For Pharmacy Admin Tracking Only    Total # of Interventions Recommended: 0  Total # of Interventions Accepted: 0  Time Spent (min): 15

## 2023-01-30 ENCOUNTER — ANTI-COAG VISIT (OUTPATIENT)
Dept: PHARMACY | Age: 73
End: 2023-01-30
Payer: MEDICARE

## 2023-01-30 DIAGNOSIS — I48.91 ATRIAL FIBRILLATION, UNSPECIFIED TYPE (HCC): Primary | ICD-10-CM

## 2023-01-30 LAB — INTERNATIONAL NORMALIZATION RATIO, POC: 4.2

## 2023-01-30 PROCEDURE — 99211 OFF/OP EST MAY X REQ PHY/QHP: CPT

## 2023-01-30 PROCEDURE — 85610 PROTHROMBIN TIME: CPT

## 2023-01-30 NOTE — PROGRESS NOTES
Ms. Jan Luu is a 74 y.o. y/o female with history of Afib Diagnosed 4/10/14. Started warfarin 4/11/15   She presents today for anticoagulation monitoring and adjustment. Pertinent PMH:Had a CV 7/10/15 but is back in a-fib. Patient Reported Findings:  Yes     No  [x]   []       Patient verifies current dosing regimen as listed took lowered weekly dose for first week then returned to 1 mg on Mon and Fri and 2 mg all other days of the week --> verified dose   []   [x]       S/S bleeding/bruising/swelling/SOB --> more bruising than normal on legs, but pretty consistent with AC. Counseled to watch if get worse, spread, pain, swelling to call doctor/911 --> denies   []   [x]       Blood in urine or stool  []   [x]       Procedures scheduled in the future at this time --> none  []   [x]       Missed dose - denies   []   [x]       Extra dose - denies   []   [x]       Change in medications - some extra tylenol recently   [x]   []       Change in health/diet/appetite greens 4 times in last week, no NVD--> less green (2-3x/week) --> continues with low greens, no NV, some diarrhea --> had diarrhea for a few days 2 weeks ago. --> no changes, no NVD --> has had more vit k over the past few weeks --> diet off d/t  having fall and needing care.  Is returning to normal--> usually 3-4x greens this past couple weeks has not been able to have consistent diet d/t to stress ( has cancer)---> back to normal diet she believes, no NVD---> trying to continue with greens, no NVD  []   [x]       Change in alcohol use- denies   []   [x]       Change in activity  []   [x]       Hospital admission  []   [x]       Emergency department visit-   []   [x]       Other complaints    Clinical Outcomes:  Yes     No  []   [x]       Major bleeding event  []   [x]       Thromboembolic event    Duration of warfarin Therapy: indefinitely  INR Range:  2.0-3.0     INR is 4.2 today potentially dt more tylenol, hasn't had a lot of vit K but not wanting to add a lot in. Hold tonight then decrease dose to 1mg on Mon, Wed & Fri and 2mg all other days (8.3%). Encouraged to maintain a consistency of vegetables/salads. Getting RF today.   Recheck INR in 2 weeks, 2/13    **Refused AVS and apt card**    Patient consent signed 1/30/2023     Referring is Dr. Carri Solis   INR (no units)   Date Value   09/05/2018 3.4   07/23/2018 2.5   06/11/2018 2.9   05/01/2018 1.9     INR,(POC) (no units)   Date Value   01/30/2023 4.2   01/04/2023 3.0   12/19/2022 4.1   11/07/2022 3.0     For Pharmacy Admin Tracking Only    Intervention Detail: Dose Adjustment: 1, reason: Therapy Optimization  Total # of Interventions Recommended: 1  Total # of Interventions Accepted: 1  Time Spent (min): 15

## 2023-02-13 ENCOUNTER — ANTI-COAG VISIT (OUTPATIENT)
Dept: PHARMACY | Age: 73
End: 2023-02-13
Payer: MEDICARE

## 2023-02-13 DIAGNOSIS — I48.91 ATRIAL FIBRILLATION, UNSPECIFIED TYPE (HCC): Primary | ICD-10-CM

## 2023-02-13 LAB — INTERNATIONAL NORMALIZATION RATIO, POC: 3.2

## 2023-02-13 PROCEDURE — 85610 PROTHROMBIN TIME: CPT

## 2023-02-13 PROCEDURE — 99211 OFF/OP EST MAY X REQ PHY/QHP: CPT

## 2023-02-13 NOTE — PROGRESS NOTES
Ms. Dick Rai is a 74 y.o. y/o female with history of Afib Diagnosed 4/10/14. Started warfarin 4/11/15   She presents today for anticoagulation monitoring and adjustment. Pertinent PMH:Had a CV 7/10/15 but is back in a-fib. Patient Reported Findings:  Yes     No  [x]   []       Patient verifies current dosing regimen as listed took lowered weekly dose for first week then returned to 1 mg on Mon and Fri and 2 mg all other days of the week --> verified dose   []   [x]       S/S bleeding/bruising/swelling/SOB --> more bruising than normal on legs, but pretty consistent with AC. Counseled to watch if get worse, spread, pain, swelling to call doctor/911 --> denies   []   [x]       Blood in urine or stool  []   [x]       Procedures scheduled in the future at this time --> none  []   [x]       Missed dose - Denies   []   [x]       Extra dose - Denies   []   [x]       Change in medications - some extra tylenol recently --> no tylenol since last visit. [x]   []       Change in health/diet/appetite greens 4 times in last week, no NVD--> less green (2-3x/week) --> continues with low greens, no NV, some diarrhea --> had diarrhea for a few days 2 weeks ago. --> no changes, no NVD --> has had more vit k over the past few weeks --> diet off d/t  having fall and needing care.  Is returning to normal--> usually 3-4x greens this past couple weeks has not been able to have consistent diet d/t to stress ( has cancer)---> back to normal diet she believes, no NVD---> trying to continue with greens, no NVD --> no changes, no NVD  []   [x]       Change in alcohol use- denies   []   [x]       Change in activity  []   [x]       Hospital admission  []   [x]       Emergency department visit-   []   [x]       Other complaints    Clinical Outcomes:  Yes     No  []   [x]       Major bleeding event  []   [x]       Thromboembolic event    Duration of warfarin Therapy: indefinitely  INR Range:  2.0-3.0     INR is 3.2 today after dose decrease at last visit. Patient has stopped tylenol and continues with greens. Would like to see another INR before adjusting. Hold tonight then continue 1mg on Mon, Wed & Fri and 2mg all other days  Encouraged to maintain a consistency of vegetables/salads.   Recheck INR in 2 weeks, 2/27    **Refused AVS and apt card**    Patient consent signed 1/30/2023     Referring is Dr. Luh Chun   INR (no units)   Date Value   09/05/2018 3.4   07/23/2018 2.5   06/11/2018 2.9   05/01/2018 1.9     INR,(POC) (no units)   Date Value   02/13/2023 3.2   01/30/2023 4.2   01/04/2023 3.0   12/19/2022 4.1     For Pharmacy Admin Tracking Only    Intervention Detail: Dose Adjustment: 1, reason: Therapy Optimization  Total # of Interventions Recommended: 1  Total # of Interventions Accepted: 1  Time Spent (min): 15

## 2023-02-27 ENCOUNTER — ANTI-COAG VISIT (OUTPATIENT)
Dept: PHARMACY | Age: 73
End: 2023-02-27
Payer: MEDICARE

## 2023-02-27 DIAGNOSIS — I48.91 ATRIAL FIBRILLATION, UNSPECIFIED TYPE (HCC): Primary | ICD-10-CM

## 2023-02-27 LAB — INTERNATIONAL NORMALIZATION RATIO, POC: 2.7

## 2023-02-27 PROCEDURE — 99211 OFF/OP EST MAY X REQ PHY/QHP: CPT

## 2023-02-27 PROCEDURE — 85610 PROTHROMBIN TIME: CPT

## 2023-02-27 NOTE — PROGRESS NOTES
Ms. Carlene Rico is a 74 y.o. y/o female with history of Afib Diagnosed 4/10/14. Started warfarin 4/11/15   She presents today for anticoagulation monitoring and adjustment. Pertinent PMH:Had a CV 7/10/15 but is back in a-fib. Patient Reported Findings:  Yes     No  [x]   []       Patient verifies current dosing regimen as listed took lowered weekly dose for first week then returned to 1 mg on Mon and Fri and 2 mg all other days of the week --> verified dose   []   [x]       S/S bleeding/bruising/swelling/SOB --> more bruising than normal on legs, but pretty consistent with AC. Counseled to watch if get worse, spread, pain, swelling to call doctor/911 --> denies   []   [x]       Blood in urine or stool  []   [x]       Procedures scheduled in the future at this time --> none  []   [x]       Missed dose - Denies   []   [x]       Extra dose - Denies   []   [x]       Change in medications - some extra tylenol recently --> no tylenol since last visit --> no changes  []   [x]       Change in health/diet/appetite greens 4 times in last week, no NVD--> less green (2-3x/week) --> continues with low greens, no NV, some diarrhea --> had diarrhea for a few days 2 weeks ago. --> no changes, no NVD --> has had more vit k over the past few weeks --> diet off d/t  having fall and needing care.  Is returning to normal--> usually 3-4x greens this past couple weeks has not been able to have consistent diet d/t to stress ( has cancer)---> back to normal diet she believes, no NVD---> trying to continue with greens, no NVD --> no changes, no NVD  []   [x]       Change in alcohol use- denies   []   [x]       Change in activity  []   [x]       Hospital admission  []   [x]       Emergency department visit-   []   [x]       Other complaints    Clinical Outcomes:  Yes     No  []   [x]       Major bleeding event  []   [x]       Thromboembolic event    Duration of warfarin Therapy: indefinitely  INR Range:  2.0-3.0     INR is 2.7 today after recent dose decrease   Continue 1mg on Mon, Wed & Fri and 2mg all other days  Encouraged to maintain a consistency of vegetables/salads.   Recheck INR in 3 weeks, 3/20/23    **Refused AVS and apt card**    Patient consent signed 1/30/2023     Referring is Dr. Elieser Hernandez   INR (no units)   Date Value   09/05/2018 3.4   07/23/2018 2.5   06/11/2018 2.9   05/01/2018 1.9     INR,(POC) (no units)   Date Value   02/27/2023 2.7   02/13/2023 3.2   01/30/2023 4.2   01/04/2023 3.0     For Pharmacy Admin Tracking Only    Total # of Interventions Recommended: 0  Total # of Interventions Accepted: 0  Time Spent (min): 15

## 2023-03-20 ENCOUNTER — ANTI-COAG VISIT (OUTPATIENT)
Dept: PHARMACY | Age: 73
End: 2023-03-20
Payer: MEDICARE

## 2023-03-20 DIAGNOSIS — I48.91 ATRIAL FIBRILLATION, UNSPECIFIED TYPE (HCC): Primary | ICD-10-CM

## 2023-03-20 LAB — INTERNATIONAL NORMALIZATION RATIO, POC: 2.9

## 2023-03-20 PROCEDURE — 85610 PROTHROMBIN TIME: CPT

## 2023-03-20 PROCEDURE — 99211 OFF/OP EST MAY X REQ PHY/QHP: CPT

## 2023-03-20 NOTE — TELEPHONE ENCOUNTER
Warfarin prescription phoned into [facility] to [facility] intern under [address] under Dr. Rhea Swanson  Warfarin 2 mg tabs  Take 1 mg on Mon, Wed and Fri and 2 mg all other days of the week  90 days   2 refills

## 2023-03-20 NOTE — PROGRESS NOTES
2.9 today    Continue 1mg on Mon, Wed & Fri and 2mg all other days  Encouraged to maintain a consistency of vegetables/salads.   Refilled warfarin at opp   Recheck INR in 4 weeks, 4/17    **Refused AVS and apt card**    Patient consent signed 1/30/2023     Referring is Dr. Vladislav Rios   INR (no units)   Date Value   09/05/2018 3.4   07/23/2018 2.5   06/11/2018 2.9   05/01/2018 1.9     INR,(POC) (no units)   Date Value   02/27/2023 2.7   02/13/2023 3.2   01/30/2023 4.2   01/04/2023 3.0     For Pharmacy Admin Tracking Only    Intervention Detail: Refill(s) Provided  Total # of Interventions Recommended: 1  Total # of Interventions Accepted: 1  Time Spent (min): 15

## 2023-04-17 ENCOUNTER — ANTI-COAG VISIT (OUTPATIENT)
Dept: PHARMACY | Age: 73
End: 2023-04-17
Payer: MEDICARE

## 2023-04-17 DIAGNOSIS — I48.91 ATRIAL FIBRILLATION, UNSPECIFIED TYPE (HCC): Primary | ICD-10-CM

## 2023-04-17 LAB — INTERNATIONAL NORMALIZATION RATIO, POC: 2.6

## 2023-04-17 PROCEDURE — 85610 PROTHROMBIN TIME: CPT

## 2023-04-17 PROCEDURE — 99211 OFF/OP EST MAY X REQ PHY/QHP: CPT

## 2023-04-17 NOTE — PROGRESS NOTES
Ms. Vandana Hatch is a 74 y.o. y/o female with history of Afib Diagnosed 4/10/14. Started warfarin 4/11/15   She presents today for anticoagulation monitoring and adjustment. Pertinent PMH:Had a CV 7/10/15 but is back in a-fib. Patient Reported Findings:  Yes     No  [x]   []       Patient verifies current dosing regimen as listed took lowered weekly dose for first week then returned to 1 mg on Mon and Fri and 2 mg all other days of the week --> verified dose   []   [x]       S/S bleeding/bruising/swelling/SOB --> more bruising than normal on legs, but pretty consistent with AC. Counseled to watch if get worse, spread, pain, swelling to call doctor/911 --> denies   []   [x]       Blood in urine or stool  []   [x]       Procedures scheduled in the future at this time --> none  []   [x]       Missed dose - Denies   []   [x]       Extra dose - Denies   []   [x]       Change in medications - some extra tylenol recently --> no tylenol since last visit --> no changes  []   [x]       Change in health/diet/appetite greens 4 times in last week, no NVD--> less green (2-3x/week) --> continues with low greens, no NV, some diarrhea --> had diarrhea for a few days 2 weeks ago. --> no changes, no NVD --> has had more vit k over the past few weeks --> diet off d/t  having fall and needing care.  Is returning to normal--> usually 3-4x greens this past couple weeks has not been able to have consistent diet d/t to stress ( has cancer)---> back to normal diet she believes, no NVD---> trying to continue with greens, no NVD --> no changes, no NVD  []   [x]       Change in alcohol use- denies   []   [x]       Change in activity  []   [x]       Hospital admission  []   [x]       Emergency department visit-   []   [x]       Other complaints    Clinical Outcomes:  Yes     No  []   [x]       Major bleeding event  []   [x]       Thromboembolic event    Duration of warfarin Therapy: indefinitely  INR Range:  2.0-3.0     INR is

## 2023-04-18 ENCOUNTER — HOSPITAL ENCOUNTER (OUTPATIENT)
Dept: WOMENS IMAGING | Age: 73
Discharge: HOME | End: 2023-04-18
Payer: MEDICARE

## 2023-04-18 ENCOUNTER — HOSPITAL ENCOUNTER (OUTPATIENT)
Dept: MAMMOGRAPHY | Age: 73
Discharge: HOME | End: 2023-04-22
Payer: MEDICARE

## 2023-04-18 DIAGNOSIS — Z12.31 VISIT FOR SCREENING MAMMOGRAM: ICD-10-CM

## 2023-04-18 PROCEDURE — 77063 BREAST TOMOSYNTHESIS BI: CPT

## 2023-05-04 ENCOUNTER — HOSPITAL ENCOUNTER (OUTPATIENT)
Dept: ULTRASOUND IMAGING | Age: 73
Discharge: HOME | End: 2023-05-04
Payer: MEDICARE

## 2023-05-04 ENCOUNTER — HOSPITAL ENCOUNTER (OUTPATIENT)
Dept: WOMENS IMAGING | Age: 73
Discharge: HOME | End: 2023-05-04
Payer: MEDICARE

## 2023-05-04 DIAGNOSIS — R92.8 OTHER ABNORMAL AND INCONCLUSIVE FINDINGS ON DIAGNOSTIC IMAGING OF BREAST: ICD-10-CM

## 2023-05-04 DIAGNOSIS — R92.8 ABNORMAL MAMMOGRAM: ICD-10-CM

## 2023-05-04 PROCEDURE — G0279 TOMOSYNTHESIS, MAMMO: HCPCS

## 2023-05-04 PROCEDURE — 76642 ULTRASOUND BREAST LIMITED: CPT

## 2023-05-22 ENCOUNTER — ANTI-COAG VISIT (OUTPATIENT)
Dept: PHARMACY | Age: 73
End: 2023-05-22
Payer: MEDICARE

## 2023-05-22 DIAGNOSIS — I48.91 ATRIAL FIBRILLATION, UNSPECIFIED TYPE (HCC): Primary | ICD-10-CM

## 2023-05-22 LAB — INTERNATIONAL NORMALIZATION RATIO, POC: 3.3

## 2023-05-22 PROCEDURE — 99211 OFF/OP EST MAY X REQ PHY/QHP: CPT

## 2023-05-22 PROCEDURE — 85610 PROTHROMBIN TIME: CPT

## 2023-05-22 NOTE — PROGRESS NOTES
card**    Patient consent signed 1/30/2023     Referring is Dr. Litzy Dc   INR (no units)   Date Value   09/05/2018 3.4   07/23/2018 2.5   06/11/2018 2.9   05/01/2018 1.9     INR,(POC) (no units)   Date Value   04/17/2023 2.6   03/20/2023 2.9   02/27/2023 2.7   02/13/2023 3.2     For Pharmacy Admin Tracking Only    Intervention Detail: Dose Adjustment: 1, reason: Therapy Optimization  Total # of Interventions Recommended: 1  Total # of Interventions Accepted: 1  Time Spent (min): 15

## 2023-05-24 ENCOUNTER — HOSPITAL ENCOUNTER (OUTPATIENT)
Dept: GENERAL RADIOLOGY | Age: 73
Discharge: HOME | End: 2023-05-24
Payer: MEDICARE

## 2023-05-24 DIAGNOSIS — M81.0 AGE-RELATED OSTEOPOROSIS WITHOUT CURRENT PATHOLOGICAL FRACTURE: ICD-10-CM

## 2023-05-24 PROCEDURE — 77080 DXA BONE DENSITY AXIAL: CPT

## 2023-06-19 ENCOUNTER — ANTI-COAG VISIT (OUTPATIENT)
Dept: PHARMACY | Age: 73
End: 2023-06-19
Payer: MEDICARE

## 2023-06-19 DIAGNOSIS — I48.91 ATRIAL FIBRILLATION, UNSPECIFIED TYPE (HCC): Primary | ICD-10-CM

## 2023-06-19 LAB — INTERNATIONAL NORMALIZATION RATIO, POC: 4

## 2023-06-19 PROCEDURE — 85610 PROTHROMBIN TIME: CPT

## 2023-06-19 PROCEDURE — 99212 OFFICE O/P EST SF 10 MIN: CPT

## 2023-06-19 NOTE — PROGRESS NOTES
Ms. Nikki Posey is a 74 y.o. y/o female with history of Afib Diagnosed 4/10/14. Started warfarin 4/11/15   She presents today for anticoagulation monitoring and adjustment. Pertinent PMH:Had a CV 7/10/15 but is back in a-fib. Patient Reported Findings:  Yes     No  [x]   []       Patient verifies current dosing regimen as listed took lowered weekly dose for first week then returned to 1 mg on Mon and Fri and 2 mg all other days of the week --> verified dose   []   [x]       S/S bleeding/bruising/swelling/SOB --> more bruising than normal on legs, but pretty consistent with AC. Counseled to watch if get worse, spread, pain, swelling to call doctor/911 --> denies   []   [x]       Blood in urine or stool  []   [x]       Procedures scheduled in the future at this time --> none  []   [x]       Missed dose - Denies   []   [x]       Extra dose - Denies   [x]   []       Change in medications - some extra tylenol recently --> no tylenol since last visit --> was on acyclovir x 7 d for shingles ---> no changes, maybe more Ca   []   [x]       Change in health/diet/appetite  usually 3-4x greens this past couple weeks has not been able to have consistent diet d/t to stress ( has cancer)---> back to normal diet she believes, no NVD---> trying to continue with greens, no NVD --> no changes, no NVD  []   [x]       Change in alcohol use- denies   []   [x]       Change in activity  []   [x]       Hospital admission  []   [x]       Emergency department visit-   []   [x]       Other complaints    Clinical Outcomes:  Yes     No  []   [x]       Major bleeding event  []   [x]       Thromboembolic event    Duration of warfarin Therapy: indefinitely  INR Range:  2.0-3.0     INR is 4.0 today    Hold tonight then decrease dose to 2mg on Sun, Tues and Thurs and 1mg all other days (9.1%)  Encouraged to maintain a consistency of vegetables/salads.   Recheck INR in 2 weeks, 7/3    **Refused AVS and apt card**    Patient consent signed

## 2023-07-03 ENCOUNTER — ANTI-COAG VISIT (OUTPATIENT)
Dept: PHARMACY | Age: 73
End: 2023-07-03
Payer: MEDICARE

## 2023-07-03 DIAGNOSIS — I48.91 ATRIAL FIBRILLATION, UNSPECIFIED TYPE (HCC): Primary | ICD-10-CM

## 2023-07-03 LAB — INTERNATIONAL NORMALIZATION RATIO, POC: 2.5

## 2023-07-03 PROCEDURE — 99211 OFF/OP EST MAY X REQ PHY/QHP: CPT

## 2023-07-03 PROCEDURE — 85610 PROTHROMBIN TIME: CPT

## 2023-07-03 NOTE — PROGRESS NOTES
Ms. Chelle Giordano is a 74 y.o. y/o female with history of Afib Diagnosed 4/10/14. Started warfarin 4/11/15   She presents today for anticoagulation monitoring and adjustment. Pertinent PMH:Had a CV 7/10/15 but is back in a-fib. Patient Reported Findings:  Yes     No  [x]   []       Patient verifies current dosing regimen as listed took lowered weekly dose for first week then returned to 1 mg on Mon and Fri and 2 mg all other days of the week --> verified dose   [x]   []       S/S bleeding/bruising/swelling/SOB --> more bruising than normal on legs, but pretty consistent with AC. Counseled to watch if get worse, spread, pain, swelling to call doctor/911 --> woke up with large bruise on right arm   []   [x]       Blood in urine or stool  []   [x]       Procedures scheduled in the future at this time --> none  []   [x]       Missed dose - Denies   []   [x]       Extra dose - Denies   []   [x]       Change in medications - some extra tylenol recently --> no tylenol since last visit --> was on acyclovir x 7 d for shingles ---> no changes   []   [x]       Change in health/diet/appetite  usually 3-4x greens this past couple weeks has not been able to have consistent diet d/t to stress ( has cancer)---> back to normal diet she believes, no NVD---> trying to continue with greens, no NVD --> no changes, no NVD  []   [x]       Change in alcohol use- denies   []   [x]       Change in activity  []   [x]       Hospital admission  []   [x]       Emergency department visit-   []   [x]       Other complaints    Clinical Outcomes:  Yes     No  []   [x]       Major bleeding event  []   [x]       Thromboembolic event    Duration of warfarin Therapy: indefinitely  INR Range:  2.0-3.0     INR is 2.5 today    Continue dose of 2mg on Sun, Tues and Thurs and 1mg all other days   Encouraged to maintain a consistency of vegetables/salads.   Recheck INR in 3 weeks, 7/24    **Refused AVS and apt card**    Patient consent signed

## 2023-07-24 ENCOUNTER — ANTI-COAG VISIT (OUTPATIENT)
Dept: PHARMACY | Age: 73
End: 2023-07-24
Payer: MEDICARE

## 2023-07-24 DIAGNOSIS — I48.91 ATRIAL FIBRILLATION, UNSPECIFIED TYPE (HCC): Primary | ICD-10-CM

## 2023-07-24 LAB — INTERNATIONAL NORMALIZATION RATIO, POC: 3.4

## 2023-07-24 PROCEDURE — 99212 OFFICE O/P EST SF 10 MIN: CPT

## 2023-07-24 PROCEDURE — 85610 PROTHROMBIN TIME: CPT

## 2023-07-24 NOTE — PROGRESS NOTES
Ms. Melita Banerjee is a 74 y.o. y/o female with history of Afib Diagnosed 4/10/14. Started warfarin 4/11/15   She presents today for anticoagulation monitoring and adjustment. Pertinent PMH:Had a CV 7/10/15 but is back in a-fib. Patient Reported Findings:  Yes     No  [x]   []       Patient verifies current dosing regimen as listed took lowered weekly dose for first week then returned to 1 mg on Mon and Fri and 2 mg all other days of the week --> verified dose   [x]   []       S/S bleeding/bruising/swelling/SOB --> more bruising than normal on legs, but pretty consistent with AC. Counseled to watch if get worse, spread, pain, swelling to call doctor/911 --> woke up with large bruise on right arm   []   [x]       Blood in urine or stool  []   [x]       Procedures scheduled in the future at this time --> none  []   [x]       Missed dose - Denies   []   [x]       Extra dose - Denies   []   [x]       Change in medications - some extra tylenol recently --> no tylenol since last visit --> was on acyclovir x 7 d for shingles ---> no changes   [x]   []       Change in health/diet/appetite  usually 3-4x greens this past couple weeks has not been able to have consistent diet d/t to stress ( has cancer)---> back to normal diet she believes, no NVD---> trying to continue with greens, no NVD --> no changes, no NVD --> not much vit k recently, none in past few days    []   [x]       Change in alcohol use- denies   []   [x]       Change in activity  []   [x]       Hospital admission  []   [x]       Emergency department visit-   []   [x]       Other complaints    Clinical Outcomes:  Yes     No  []   [x]       Major bleeding event  []   [x]       Thromboembolic event    Duration of warfarin Therapy: indefinitely  INR Range:  2.0-3.0     INR is 3.4 today after less vit k.  Since does not plan to return to vit k in the near future, will lower weekly dose    Decrease weekly dose to 2mg on Sun and Thurs and 1mg all other days

## 2023-08-07 ENCOUNTER — ANTI-COAG VISIT (OUTPATIENT)
Dept: PHARMACY | Age: 73
End: 2023-08-07
Payer: MEDICARE

## 2023-08-07 DIAGNOSIS — I48.91 ATRIAL FIBRILLATION, UNSPECIFIED TYPE (HCC): Primary | ICD-10-CM

## 2023-08-07 LAB — INTERNATIONAL NORMALIZATION RATIO, POC: 5.4

## 2023-08-07 PROCEDURE — 99212 OFFICE O/P EST SF 10 MIN: CPT

## 2023-08-07 PROCEDURE — 85610 PROTHROMBIN TIME: CPT

## 2023-08-07 NOTE — PROGRESS NOTES
Ms. Rylie Hart is a 74 y.o. y/o female with history of Afib Diagnosed 4/10/14. Started warfarin 4/11/15   She presents today for anticoagulation monitoring and adjustment. Pertinent PMH:Had a CV 7/10/15 but is back in a-fib. Patient Reported Findings:  Yes     No  [x]   []       Patient verifies current dosing regimen as listed took lowered weekly dose for first week then returned to 1 mg on Mon and Fri and 2 mg all other days of the week --> verified dose   []   [x]       S/S bleeding/bruising/swelling/SOB --> more bruising than normal on legs, but pretty consistent with AC. Counseled to watch if get worse, spread, pain, swelling to call doctor/911 --> woke up with large bruise on right arm --> denies    []   [x]       Blood in urine or stool  []   [x]       Procedures scheduled in the future at this time --> none  []   [x]       Missed dose - Denies   []   [x]       Extra dose - Denies   [x]   []       Change in medications - some extra tylenol recently --> no tylenol since last visit --> was on acyclovir x 7 d for shingles ---> was on flagyl and cipro x 10 days starting 7/31.  Finishes on wed   []   [x]       Change in health/diet/appetite  usually 3-4x greens this past couple weeks has not been able to have consistent diet d/t to stress ( has cancer)---> back to normal diet she believes, no NVD---> trying to continue with greens, no NVD  --> not much vit k recently, none in past few days --> no changes, no NVD  []   [x]       Change in alcohol use- denies   []   [x]       Change in activity  []   [x]       Hospital admission  []   [x]       Emergency department visit-   []   [x]       Other complaints    Clinical Outcomes:  Yes     No  []   [x]       Major bleeding event  []   [x]       Thromboembolic event    Duration of warfarin Therapy: indefinitely  INR Range:  2.0-3.0     INR is 5.4 today d/t abx for past week   Hold dose tonight and tomorrow then continue weekly dose of 2mg on Sun and Thurs and

## 2023-08-17 ENCOUNTER — ANTI-COAG VISIT (OUTPATIENT)
Dept: PHARMACY | Age: 73
End: 2023-08-17
Payer: MEDICARE

## 2023-08-17 DIAGNOSIS — I48.91 ATRIAL FIBRILLATION, UNSPECIFIED TYPE (HCC): Primary | ICD-10-CM

## 2023-08-17 LAB — INTERNATIONAL NORMALIZATION RATIO, POC: 2.5

## 2023-08-17 PROCEDURE — 85610 PROTHROMBIN TIME: CPT

## 2023-08-17 PROCEDURE — 99211 OFF/OP EST MAY X REQ PHY/QHP: CPT

## 2023-08-17 NOTE — PROGRESS NOTES
Encouraged to maintain a consistency of vegetables/salads.   Recheck INR in 2.5 weeks, 9/5    **Refused AVS and apt card**    Patient consent signed 1/30/2023     Referring is Dr. Ellie Leon   INR (no units)   Date Value   09/05/2018 3.4   07/23/2018 2.5   06/11/2018 2.9   05/01/2018 1.9     INR,(POC) (no units)   Date Value   08/17/2023 2.5   08/07/2023 5.4   07/24/2023 3.4   07/03/2023 2.5     For Pharmacy Admin Tracking Only    Total # of Interventions Recommended: 0  Total # of Interventions Accepted: 0  Time Spent (min): 15

## 2023-09-05 ENCOUNTER — ANTI-COAG VISIT (OUTPATIENT)
Dept: PHARMACY | Age: 73
End: 2023-09-05
Payer: MEDICARE

## 2023-09-05 DIAGNOSIS — I48.91 ATRIAL FIBRILLATION, UNSPECIFIED TYPE (HCC): Primary | ICD-10-CM

## 2023-09-05 LAB — INTERNATIONAL NORMALIZATION RATIO, POC: 2.6

## 2023-09-05 PROCEDURE — 85610 PROTHROMBIN TIME: CPT

## 2023-09-05 PROCEDURE — 99211 OFF/OP EST MAY X REQ PHY/QHP: CPT

## 2023-09-05 NOTE — PROGRESS NOTES
Ms. Arlen Moore is a 74 y.o. y/o female with history of Afib Diagnosed 4/10/14. Started warfarin 4/11/15   She presents today for anticoagulation monitoring and adjustment. Pertinent PMH:Had a CV 7/10/15 but is back in a-fib. Patient Reported Findings:  Yes     No  [x]   []       Patient verifies current dosing regimen as listed took lowered weekly dose for first week then returned to 1 mg on Mon and Fri and 2 mg all other days of the week --> verified dose   []   [x]       S/S bleeding/bruising/swelling/SOB --> more bruising than normal on legs, but pretty consistent with AC. Counseled to watch if get worse, spread, pain, swelling to call doctor/911 --> woke up with large bruise on right arm --> denies    []   [x]       Blood in urine or stool  []   [x]       Procedures scheduled in the future at this time --> none  []   [x]       Missed dose - Denies   []   [x]       Extra dose - Denies   []   [x]       Change in medications - some extra tylenol recently --> no tylenol since last visit --> was on acyclovir x 7 d for shingles ---> was on flagyl and cipro x 10 days starting 7/31.  Finishes on wed---> done    []   [x]       Change in health/diet/appetite  usually 3-4x greens this past couple weeks has not been able to have consistent diet d/t to stress ( has cancer)---> back to normal diet she believes, no NVD---> trying to continue with greens, no NVD  --> not much vit k recently, none in past few days --> no changes, no NVD  []   [x]       Change in alcohol use- denies   []   [x]       Change in activity  []   [x]       Hospital admission  []   [x]       Emergency department visit-   []   [x]       Other complaints    Clinical Outcomes:  Yes     No  []   [x]       Major bleeding event  []   [x]       Thromboembolic event    Duration of warfarin Therapy: indefinitely  INR Range:  2.0-3.0     INR is 2.6 today  Continue weekly dose of 2mg on Sun and Thurs and 1mg all other days    Encouraged to maintain a

## 2023-09-25 ENCOUNTER — ANTI-COAG VISIT (OUTPATIENT)
Dept: PHARMACY | Age: 73
End: 2023-09-25
Payer: MEDICARE

## 2023-09-25 DIAGNOSIS — I48.91 ATRIAL FIBRILLATION, UNSPECIFIED TYPE (HCC): Primary | ICD-10-CM

## 2023-09-25 LAB — INTERNATIONAL NORMALIZATION RATIO, POC: 2.6

## 2023-09-25 PROCEDURE — 99211 OFF/OP EST MAY X REQ PHY/QHP: CPT

## 2023-09-25 PROCEDURE — 85610 PROTHROMBIN TIME: CPT

## 2023-09-25 NOTE — PROGRESS NOTES
Ms. Chelle Giordano is a 74 y.o. y/o female with history of Afib Diagnosed 4/10/14. Started warfarin 4/11/15   She presents today for anticoagulation monitoring and adjustment. Pertinent PMH:Had a CV 7/10/15 but is back in a-fib. Patient Reported Findings:  Yes     No  [x]   []       Patient verifies current dosing regimen as listed took lowered weekly dose for first week then returned to 1 mg on Mon and Fri and 2 mg all other days of the week --> verified dose   []   [x]       S/S bleeding/bruising/swelling/SOB --> more bruising than normal on legs, but pretty consistent with AC. Counseled to watch if get worse, spread, pain, swelling to call doctor/911 --> woke up with large bruise on right arm --> denies    []   [x]       Blood in urine or stool  []   [x]       Procedures scheduled in the future at this time --> none  []   [x]       Missed dose - Denies   []   [x]       Extra dose - Denies   []   [x]       Change in medications - some extra tylenol recently --> no tylenol since last visit --> was on acyclovir x 7 d for shingles ---> was on flagyl and cipro x 10 days starting 7/31. Finishes on wed---> done    []   [x]       Change in health/diet/appetite  usually 3-4x greens this past couple weeks has not been able to have consistent diet d/t to stress ( has cancer)---> back to normal diet she believes, no NVD---> trying to continue with greens, no NVD  --> not much vit k recently, none in past few days --> no changes, no NVD  []   [x]       Change in alcohol use- denies   []   [x]       Change in activity  []   [x]       Hospital admission  []   [x]       Emergency department visit-   []   [x]       Other complaints    Clinical Outcomes:  Yes     No  []   [x]       Major bleeding event  []   [x]       Thromboembolic event    Duration of warfarin Therapy: indefinitely  INR Range:  2.0-3.0     INR is 2.6 today  Continue weekly dose of 2mg on Sun and Thurs and 1mg all other days.     Encouraged to maintain a

## 2023-10-23 ENCOUNTER — ANTI-COAG VISIT (OUTPATIENT)
Dept: PHARMACY | Age: 73
End: 2023-10-23
Payer: MEDICARE

## 2023-10-23 DIAGNOSIS — I48.91 ATRIAL FIBRILLATION, UNSPECIFIED TYPE (HCC): Primary | ICD-10-CM

## 2023-10-23 LAB — INTERNATIONAL NORMALIZATION RATIO, POC: 2.4

## 2023-10-23 PROCEDURE — 99211 OFF/OP EST MAY X REQ PHY/QHP: CPT

## 2023-10-23 PROCEDURE — 85610 PROTHROMBIN TIME: CPT

## 2023-10-23 NOTE — PROGRESS NOTES
consistency of vegetables/salads.   Recheck INR in 6 weeks, on     **Refused AVS and apt card**    Patient consent signed 2023     Referring is Dr. Talib Boo   INR (no units)   Date Value   2018 3.4   2018 2.5   2018 2.9   2018 1.9     INR,(POC) (no units)   Date Value   2023 2.6   2023 2.6   2023 2.5   2023 5.4     For Pharmacy Admin Tracking Only    Intervention Detail: Adherence Monitorin  Total # of Interventions Recommended: 0  Total # of Interventions Accepted: 0  Time Spent (min): 15

## 2023-11-01 ENCOUNTER — HOSPITAL ENCOUNTER (OUTPATIENT)
Dept: WOMENS IMAGING | Age: 73
Discharge: HOME | End: 2023-11-01
Payer: MEDICARE

## 2023-11-01 ENCOUNTER — HOSPITAL ENCOUNTER (OUTPATIENT)
Dept: ULTRASOUND IMAGING | Age: 73
Discharge: HOME | End: 2023-11-01
Payer: MEDICARE

## 2023-11-01 DIAGNOSIS — R92.8 OTHER ABNORMAL AND INCONCLUSIVE FINDINGS ON DIAGNOSTIC IMAGING OF BREAST: ICD-10-CM

## 2023-11-01 PROCEDURE — G0279 TOMOSYNTHESIS, MAMMO: HCPCS

## 2023-11-01 PROCEDURE — 76641 ULTRASOUND BREAST COMPLETE: CPT

## 2023-12-04 ENCOUNTER — ANTI-COAG VISIT (OUTPATIENT)
Dept: PHARMACY | Age: 73
End: 2023-12-04
Payer: MEDICARE

## 2023-12-04 DIAGNOSIS — I48.91 ATRIAL FIBRILLATION, UNSPECIFIED TYPE (HCC): Primary | ICD-10-CM

## 2023-12-04 LAB — INTERNATIONAL NORMALIZATION RATIO, POC: 2.1

## 2023-12-04 PROCEDURE — 85610 PROTHROMBIN TIME: CPT

## 2023-12-04 PROCEDURE — 99211 OFF/OP EST MAY X REQ PHY/QHP: CPT

## 2024-01-15 ENCOUNTER — ANTI-COAG VISIT (OUTPATIENT)
Dept: PHARMACY | Age: 74
End: 2024-01-15
Payer: MEDICARE

## 2024-01-15 DIAGNOSIS — I48.91 ATRIAL FIBRILLATION, UNSPECIFIED TYPE (HCC): Primary | ICD-10-CM

## 2024-01-15 LAB — INTERNATIONAL NORMALIZATION RATIO, POC: 3

## 2024-01-15 PROCEDURE — 99211 OFF/OP EST MAY X REQ PHY/QHP: CPT

## 2024-01-15 PROCEDURE — 85610 PROTHROMBIN TIME: CPT

## 2024-01-15 NOTE — PROGRESS NOTES
Ms. Eva Johnson is a 73 y.o. y/o female with history of Afib Diagnosed 4/10/14. Started warfarin 4/11/15   She presents today for anticoagulation monitoring and adjustment.  Pertinent PMH:Had a CV 7/10/15 but is back in a-fib.  Patient Reported Findings:  Yes     No  [x]   []       Patient verifies current dosing regimen as listed took lowered weekly dose for first week then returned to 1 mg on Mon and Fri and 2 mg all other days of the week --> verified dose   []   [x]       S/S bleeding/bruising/swelling/SOB --> more bruising than normal on legs, but pretty consistent with AC. Counseled to watch if get worse, spread, pain, swelling to call doctor/911 --> woke up with large bruise on right arm --> denies    []   [x]       Blood in urine or stool  []   [x]       Procedures scheduled in the future at this time --> none  []   [x]       Missed dose - Denies   []   [x]       Extra dose - Denies   []   [x]       Change in medications -  denies changes   []   [x]       Change in health/diet/appetite  usually 3-4x greens this past couple weeks has not been able to have consistent diet d/t to stress ( has cancer)---> back to normal diet she believes, no NVD---> trying to continue with greens, no NVD  --> not much vit k recently, none in past few days --> fewer greens recently, no NVD  []   [x]       Change in alcohol use- denies   []   [x]       Change in activity  []   [x]       Hospital admission  []   [x]       Emergency department visit-   []   [x]       Other complaints    Clinical Outcomes:  Yes     No  []   [x]       Major bleeding event  []   [x]       Thromboembolic event    Duration of warfarin Therapy: indefinitely  INR Range:  2.0-3.0     INR is 3.0 today  Continue weekly dose of 2mg on Sun and Thurs and 1mg all other days.    Encouraged to maintain a consistency of vegetables/salads.  Recheck INR in 6 weeks, on 2/19    **Refused AVS and apt card**    Patient consent signed 1/30/2023     Referring is

## 2024-02-19 ENCOUNTER — ANTI-COAG VISIT (OUTPATIENT)
Dept: PHARMACY | Age: 74
End: 2024-02-19
Payer: MEDICARE

## 2024-02-19 DIAGNOSIS — I48.91 ATRIAL FIBRILLATION, UNSPECIFIED TYPE (HCC): Primary | ICD-10-CM

## 2024-02-19 LAB — INTERNATIONAL NORMALIZATION RATIO, POC: 1.8

## 2024-02-19 PROCEDURE — 99211 OFF/OP EST MAY X REQ PHY/QHP: CPT

## 2024-02-19 PROCEDURE — 85610 PROTHROMBIN TIME: CPT

## 2024-02-19 NOTE — PROGRESS NOTES
Ms. Eva Johnson is a 73 y.o. y/o female with history of Afib Diagnosed 4/10/14. Started warfarin 4/11/15   She presents today for anticoagulation monitoring and adjustment.  Pertinent PMH:Had a CV 7/10/15 but is back in a-fib.  Patient Reported Findings:  Yes     No  [x]   []       Patient verifies current dosing regimen as listed took lowered weekly dose for first week then returned to 1 mg on Mon and Fri and 2 mg all other days of the week --> verified dose   []   [x]       S/S bleeding/bruising/swelling/SOB --> more bruising than normal on legs, but pretty consistent with AC. Counseled to watch if get worse, spread, pain, swelling to call doctor/911 --> woke up with large bruise on right arm --> denies    []   [x]       Blood in urine or stool  []   [x]       Procedures scheduled in the future at this time --> none  []   [x]       Missed dose - Denies   []   [x]       Extra dose - Denies   []   [x]       Change in medications -  denies changes   []   [x]       Change in health/diet/appetite  usually 3-4x greens this past couple weeks has not been able to have consistent diet d/t to stress ( has cancer)---> back to normal diet she believes, no NVD---> trying to continue with greens, no NVD  --> not much vit k recently, none in past few days --> fewer greens recently, no NVD---> had more greens recently, no NVD  []   [x]       Change in alcohol use- denies   []   [x]       Change in activity  []   [x]       Hospital admission  []   [x]       Emergency department visit-   []   [x]       Other complaints    Clinical Outcomes:  Yes     No  []   [x]       Major bleeding event  []   [x]       Thromboembolic event    Duration of warfarin Therapy: indefinitely  INR Range:  2.0-3.0   RF at [facility]    INR is 1.8 today dt more greens   Take 2mg tonight then continue taking dose of 2mg on Sun and Thurs and 1mg all other days    Encouraged to maintain a consistency of vegetables/salads.  Recheck INR in 6 weeks,

## 2024-04-01 ENCOUNTER — ANTI-COAG VISIT (OUTPATIENT)
Dept: PHARMACY | Age: 74
End: 2024-04-01
Payer: MEDICARE

## 2024-04-01 DIAGNOSIS — I48.91 ATRIAL FIBRILLATION, UNSPECIFIED TYPE (HCC): Primary | ICD-10-CM

## 2024-04-01 LAB — INTERNATIONAL NORMALIZATION RATIO, POC: 1.9

## 2024-04-01 PROCEDURE — 99212 OFFICE O/P EST SF 10 MIN: CPT

## 2024-04-01 PROCEDURE — 85610 PROTHROMBIN TIME: CPT

## 2024-04-01 NOTE — PROGRESS NOTES
Ms. Eva Johnson is a 74 y.o. y/o female with history of Afib Diagnosed 4/10/14. Started warfarin 4/11/15   She presents today for anticoagulation monitoring and adjustment.  Pertinent PMH:Had a CV 7/10/15 but is back in a-fib.  Patient Reported Findings:  Yes     No  [x]   []       Patient verifies current dosing regimen as listed took lowered weekly dose for first week then returned to 1 mg on Mon and Fri and 2 mg all other days of the week --> verified dose   []   [x]       S/S bleeding/bruising/swelling/SOB --> more bruising than normal on legs, but pretty consistent with AC. Counseled to watch if get worse, spread, pain, swelling to call doctor/911 --> woke up with large bruise on right arm --> denies    []   [x]       Blood in urine or stool  []   [x]       Procedures scheduled in the future at this time --> none  []   [x]       Missed dose - Denies   []   [x]       Extra dose - Denies   []   [x]       Change in medications -  denies changes   []   [x]       Change in health/diet/appetite  usually 3-4x greens this past couple weeks has not been able to have consistent diet d/t to stress ( has cancer)---> back to normal diet she believes, no NVD---> trying to continue with greens, no NVD  --> not much vit k recently, none in past few days --> fewer greens recently, no NVD---> had more greens recently, no NVD--->  no greens   []   [x]       Change in alcohol use- denies   []   [x]       Change in activity  []   [x]       Hospital admission  []   [x]       Emergency department visit-   []   [x]       Other complaints    Clinical Outcomes:  Yes     No  []   [x]       Major bleeding event  []   [x]       Thromboembolic event    Duration of warfarin Therapy: indefinitely  INR Range:  2.0-3.0   RF at [facility].  Wants to discuss Eliquis with doctor in May.    INR is 1.9 today   INR fluctuating recently dt diet and stress. States messed up dose recently while  was in hospital but it was over a week ago.

## 2024-04-15 ENCOUNTER — ANTI-COAG VISIT (OUTPATIENT)
Dept: PHARMACY | Age: 74
End: 2024-04-15
Payer: MEDICARE

## 2024-04-15 DIAGNOSIS — I48.0 PAROXYSMAL ATRIAL FIBRILLATION (HCC): Primary | ICD-10-CM

## 2024-04-15 LAB — INTERNATIONAL NORMALIZATION RATIO, POC: 2.8

## 2024-04-15 PROCEDURE — 99211 OFF/OP EST MAY X REQ PHY/QHP: CPT

## 2024-04-15 PROCEDURE — 85610 PROTHROMBIN TIME: CPT

## 2024-04-15 NOTE — PROGRESS NOTES
Ms. Eva Johnson is a 74 y.o. y/o female with history of Afib Diagnosed 4/10/14. Started warfarin 4/11/15   She presents today for anticoagulation monitoring and adjustment.  Pertinent PMH:Had a CV 7/10/15 but is back in a-fib.  Patient Reported Findings:  Yes     No  [x]   []       Patient verifies current dosing regimen as listed took lowered weekly dose for first week then returned to 1 mg on Mon and Fri and 2 mg all other days of the week --> verified dose   []   [x]       S/S bleeding/bruising/swelling/SOB --> more bruising than normal on legs, but pretty consistent with AC. Counseled to watch if get worse, spread, pain, swelling to call doctor/911 --> woke up with large bruise on right arm --> denies    []   [x]       Blood in urine or stool  []   [x]       Procedures scheduled in the future at this time --> none  []   [x]       Missed dose - Denies   []   [x]       Extra dose - Denies   []   [x]       Change in medications -  denies changes   []   [x]       Change in health/diet/appetite  usually 3-4x greens this past couple weeks has not been able to have consistent diet d/t to stress ( has cancer)---> back to normal diet she believes, no NVD---> trying to continue with greens, no NVD  --> not much vit k recently, none in past few days --> fewer greens recently, no NVD---> had more greens recently, no NVD--->  no greens   []   [x]       Change in alcohol use- denies   []   [x]       Change in activity  []   [x]       Hospital admission  []   [x]       Emergency department visit-   []   [x]       Other complaints    Clinical Outcomes:  Yes     No  []   [x]       Major bleeding event  []   [x]       Thromboembolic event    Duration of warfarin Therapy: indefinitely  INR Range:  2.0-3.0   RF at [facility].  Wants to discuss Eliquis with doctor in May.    INR is 2.8 today   INR fluctuating recently dt diet and stress. States messed up dose recently while  was in hospital but it was over a week ago.

## 2024-04-15 NOTE — TELEPHONE ENCOUNTER
Warfarin prescription phoned into [facility] to [place] [id number] under Dr. Leslie Short   Warfarin 2 mg tabs  Take 2 mg every Sun, Tue, Thurs and 1 mg all other days of the week  90 days   2 refills    Aleja Nickerson, PharmD, [facility]

## 2024-04-18 ENCOUNTER — HOSPITAL ENCOUNTER (OUTPATIENT)
Dept: WOMENS IMAGING | Age: 74
Discharge: HOME | End: 2024-04-18
Payer: MEDICARE

## 2024-04-18 VITALS — HEIGHT: 65 IN | BODY MASS INDEX: 27.99 KG/M2 | WEIGHT: 168 LBS

## 2024-04-18 DIAGNOSIS — Z12.31 VISIT FOR SCREENING MAMMOGRAM: ICD-10-CM

## 2024-04-18 DIAGNOSIS — R92.8 FOLLOW-UP EXAMINATION OF ABNORMAL MAMMOGRAM: ICD-10-CM

## 2024-04-18 PROCEDURE — G0279 TOMOSYNTHESIS, MAMMO: HCPCS

## 2024-05-24 ENCOUNTER — ANTI-COAG VISIT (OUTPATIENT)
Dept: PHARMACY | Age: 74
End: 2024-05-24
Payer: MEDICARE

## 2024-05-24 DIAGNOSIS — I48.91 ATRIAL FIBRILLATION, UNSPECIFIED TYPE (HCC): Primary | ICD-10-CM

## 2024-05-24 LAB — INTERNATIONAL NORMALIZATION RATIO, POC: 2.8

## 2024-05-24 PROCEDURE — 85610 PROTHROMBIN TIME: CPT

## 2024-05-24 PROCEDURE — 99211 OFF/OP EST MAY X REQ PHY/QHP: CPT

## 2024-05-24 NOTE — PROGRESS NOTES
Ms. Eva Johnson is a 74 y.o. y/o female with history of Afib Diagnosed 4/10/14. Started warfarin 4/11/15   She presents today for anticoagulation monitoring and adjustment.  Pertinent PMH:Had a CV 7/10/15 but is back in a-fib.  Patient Reported Findings:  Yes     No  [x]   []       Patient verifies current dosing regimen as listed - verified dose   []   [x]       S/S bleeding/bruising/swelling/SOB --> woke up with large bruise on right arm --> denies    []   [x]       Blood in urine or stool  []   [x]       Procedures scheduled in the future at this time --> none  []   [x]       Missed dose - Denies   []   [x]       Extra dose - Denies   []   [x]       Change in medications -  denies changes   []   [x]       Change in health/diet/appetite  usually 3-4x greens this past couple weeks has not been able to have consistent diet d/t to stress ( has cancer)---> back to normal diet she believes, no NVD---> trying to continue with greens, no NVD  --> not much vit k recently, none in past few days --> fewer greens recently, no NVD---> had more greens recently, no NVD--->  no greens --> no changes   []   [x]       Change in alcohol use- denies   []   [x]       Change in activity  []   [x]       Hospital admission  []   [x]       Emergency department visit-   []   [x]       Other complaints    Clinical Outcomes:  Yes     No  []   [x]       Major bleeding event  []   [x]       Thromboembolic event    Duration of warfarin Therapy: indefinitely  INR Range:  2.0-3.0   RF at [facility].  Wants to discuss Eliquis with doctor in May.    INR is 2.8 again today   Continue dose of 2mg on Sun, Tues and Thurs and 1mg all other days   Encouraged to maintain a consistency of vegetables/salads.  Recheck INR in 6 weeks, 7/1    **Refused AVS and apt card**  Patient consent signed 8/16/23     Referring is Dr. Leslie Short   INR (no units)   Date Value   09/05/2018 3.4   07/23/2018 2.5   06/11/2018 2.9   05/01/2018 1.9     INR,(POC) (no units)

## 2024-06-07 NOTE — TELEPHONE ENCOUNTER
Warfarin prescription phoned in and spoke to [facility] pharmacy [phone] under Dr. Leslie Short MD NPI [id number]   Warfarin 2 mg tabs  Take 2 mg on Sun, Tues and Thurs and 1 mg all other days of the week  90 days   3 refills

## 2024-07-02 ENCOUNTER — ANTI-COAG VISIT (OUTPATIENT)
Dept: PHARMACY | Age: 74
End: 2024-07-02
Payer: MEDICARE

## 2024-07-02 DIAGNOSIS — I48.91 ATRIAL FIBRILLATION, UNSPECIFIED TYPE (HCC): Primary | ICD-10-CM

## 2024-07-02 LAB
INTERNATIONAL NORMALIZATION RATIO, POC: 2.5
PROTHROMBIN TIME, POC: NORMAL

## 2024-07-02 PROCEDURE — 85610 PROTHROMBIN TIME: CPT

## 2024-07-02 PROCEDURE — 99211 OFF/OP EST MAY X REQ PHY/QHP: CPT

## 2024-07-02 NOTE — PROGRESS NOTES
Ms. Eva Johnson is a 74 y.o. y/o female with history of Afib Diagnosed 4/10/14. Started warfarin 4/11/15   She presents today for anticoagulation monitoring and adjustment.  Pertinent PMH:Had a CV 7/10/15 but is back in a-fib.  Patient Reported Findings:  Yes     No  [x]   []       Patient verifies current dosing regimen as listed - verified dose   []   [x]       S/S bleeding/bruising/swelling/SOB --> woke up with large bruise on right arm --> denies    []   [x]       Blood in urine or stool  []   [x]       Procedures scheduled in the future at this time --> none  []   [x]       Missed dose - Denies   []   [x]       Extra dose - Denies   []   [x]       Change in medications -  denies changes   []   [x]       Change in health/diet/appetite  usually 3-4x greens this past couple weeks has not been able to have consistent diet d/t to stress ( has cancer)---> back to normal diet she believes, no NVD---> trying to continue with greens, no NVD  --> not much vit k recently, none in past few days --> fewer greens recently, no NVD---> had more greens recently, no NVD--->  no greens --> no changes   []   [x]       Change in alcohol use- denies   []   [x]       Change in activity  []   [x]       Hospital admission  []   [x]       Emergency department visit-   []   [x]       Other complaints    Clinical Outcomes:  Yes     No  []   [x]       Major bleeding event  []   [x]       Thromboembolic event    Duration of warfarin Therapy: indefinitely  INR Range:  2.0-3.0   RF at [facility].  Wants to discuss Eliquis with doctor in May.    INR is 2.5 today   Continue dose of 2mg on Sun, Tues and Thurs and 1mg all other days   Encouraged to maintain a consistency of vegetables/salads.  Recheck INR in 6 weeks, 8/13  **Refused AVS and apt card**  Patient consent signed 8/16/23     Referring is Dr. Leslie Short   INR (no units)   Date Value   09/05/2018 3.4   07/23/2018 2.5   06/11/2018 2.9   05/01/2018 1.9     INR,(POC) (no units)   Date

## 2024-08-13 ENCOUNTER — ANTI-COAG VISIT (OUTPATIENT)
Dept: PHARMACY | Age: 74
End: 2024-08-13
Payer: MEDICARE

## 2024-08-13 DIAGNOSIS — I48.91 ATRIAL FIBRILLATION, UNSPECIFIED TYPE (HCC): Primary | ICD-10-CM

## 2024-08-13 LAB
INTERNATIONAL NORMALIZATION RATIO, POC: 3.6
PROTHROMBIN TIME, POC: 0

## 2024-08-13 PROCEDURE — 85610 PROTHROMBIN TIME: CPT | Performed by: PHYSICIAN ASSISTANT

## 2024-08-13 PROCEDURE — 99211 OFF/OP EST MAY X REQ PHY/QHP: CPT | Performed by: PHYSICIAN ASSISTANT

## 2024-08-13 NOTE — PROGRESS NOTES
Ms. Eva Johnson is a 74 y.o. y/o female with history of Afib Diagnosed 4/10/14. Started warfarin 4/11/15   She presents today for anticoagulation monitoring and adjustment.  Pertinent PMH:Had a CV 7/10/15 but is back in a-fib.  Patient Reported Findings:  Yes     No  [x]   []       Patient verifies current dosing regimen as listed - verified dose   []   [x]       S/S bleeding/bruising/swelling/SOB --> woke up with large bruise on right arm --> denies    []   [x]       Blood in urine or stool  []   [x]       Procedures scheduled in the future at this time --> none  []   [x]       Missed dose - Denies   []   [x]       Extra dose - Denies   []   [x]       Change in medications -  denies changes   [x]   []       Change in health/diet/appetite  usually 3-4x greens this past couple weeks has not been able to have consistent diet d/t to stress ( has cancer)---> back to normal diet she believes, no NVD---> trying to continue with greens, no NVD  --> not much vit k recently, none in past few days --> fewer greens recently, no NVD---> had more greens recently, no NVD--->  no greens --> appetite diminished recently   []   [x]       Change in alcohol use- denies   []   [x]       Change in activity  []   [x]       Hospital admission  []   [x]       Emergency department visit-   []   [x]       Other complaints    Clinical Outcomes:  Yes     No  []   [x]       Major bleeding event  []   [x]       Thromboembolic event    Duration of warfarin Therapy: indefinitely  INR Range:  2.0-3.0   RF at [facility].  Wants to discuss Eliquis with doctor in May.    INR is 3.6 today after acute stress with  in hospital   Hold dose tonight then continue dose of 2mg on Sun, Tues and Thurs and 1mg all other days   Encouraged to maintain a consistency of vegetables/salads.  Recheck INR in 3 weeks, 9/3  Return to 6 week appt when appropriate     **Refused AVS and apt card**    Patient consent signed 8/16/23     Referring is Dr. Leslie Short

## 2024-09-03 ENCOUNTER — ANTI-COAG VISIT (OUTPATIENT)
Dept: PHARMACY | Age: 74
End: 2024-09-03
Payer: MEDICARE

## 2024-09-03 DIAGNOSIS — I48.91 ATRIAL FIBRILLATION, UNSPECIFIED TYPE (HCC): Primary | ICD-10-CM

## 2024-09-03 LAB
INTERNATIONAL NORMALIZATION RATIO, POC: 2.1
PROTHROMBIN TIME, POC: 0

## 2024-09-03 PROCEDURE — 85610 PROTHROMBIN TIME: CPT

## 2024-09-03 PROCEDURE — 99211 OFF/OP EST MAY X REQ PHY/QHP: CPT

## 2024-09-03 NOTE — PROGRESS NOTES
Ms. Eva Johnson is a 74 y.o. y/o female with history of Afib Diagnosed 4/10/14. Started warfarin 4/11/15   She presents today for anticoagulation monitoring and adjustment.  Pertinent PMH:Had a CV 7/10/15 but is back in a-fib.  Patient Reported Findings:  Yes     No  [x]   []       Patient verifies current dosing regimen as listed - verified dose   []   [x]       S/S bleeding/bruising/swelling/SOB --> woke up with large bruise on right arm --> denies    []   [x]       Blood in urine or stool  []   [x]       Procedures scheduled in the future at this time --> none  []   [x]       Missed dose - Denies   []   [x]       Extra dose - Denies   []   [x]       Change in medications -  denies changes   [x]   []       Change in health/diet/appetite  usually 3-4x greens this past couple weeks has not been able to have consistent diet d/t to stress ( has cancer)---> back to normal diet she believes, no NVD---> trying to continue with greens, no NVD  --> not much vit k recently, none in past few days --> fewer greens recently, no NVD---> had more greens recently, no NVD--->  no greens --> appetite diminished recently   []   [x]       Change in alcohol use- denies   []   [x]       Change in activity  []   [x]       Hospital admission  []   [x]       Emergency department visit-   []   [x]       Other complaints-  recently passed away     Clinical Outcomes:  Yes     No  []   [x]       Major bleeding event  []   [x]       Thromboembolic event    Duration of warfarin Therapy: indefinitely  INR Range:  2.0-3.0   RF at [facility].  Wants to discuss Eliquis with doctor in May.    INR is 2.1 today   Continue dose of 2mg on Sun, Tues and Thurs and 1mg all other days   Encouraged to maintain a consistency of vegetables/salads.  Recheck INR in 6 weeks, 10/14  **Refused AVS and apt card**  Patient consent signed 8/16/23     Referring is Dr. Leslie Short   INR (no units)   Date Value   09/05/2018 3.4   07/23/2018 2.5   06/11/2018

## 2024-10-14 ENCOUNTER — ANTI-COAG VISIT (OUTPATIENT)
Dept: PHARMACY | Age: 74
End: 2024-10-14
Payer: MEDICARE

## 2024-10-14 DIAGNOSIS — I48.91 ATRIAL FIBRILLATION, UNSPECIFIED TYPE (HCC): Primary | ICD-10-CM

## 2024-10-14 LAB
INTERNATIONAL NORMALIZATION RATIO, POC: 3.2
PROTHROMBIN TIME, POC: 0

## 2024-10-14 PROCEDURE — 85610 PROTHROMBIN TIME: CPT

## 2024-10-14 PROCEDURE — 99211 OFF/OP EST MAY X REQ PHY/QHP: CPT

## 2024-10-14 NOTE — PROGRESS NOTES
Referring is Dr. Leslie Short   INR (no units)   Date Value   2018 3.4   2018 2.5   2018 2.9   2018 1.9     INR,(POC) (no units)   Date Value   10/14/2024 3.2   2024 2.1   2024 3.6   2024 2.5     For Pharmacy Admin Tracking Only    Intervention Detail: Adherence Monitorin and Dose Adjustment: 1, reason: Therapy Optimization  Total # of Interventions Recommended: 2  Total # of Interventions Accepted: 2  Time Spent (min): 15

## 2024-11-25 ENCOUNTER — ANTI-COAG VISIT (OUTPATIENT)
Dept: PHARMACY | Age: 74
End: 2024-11-25
Payer: MEDICARE

## 2024-11-25 DIAGNOSIS — I48.91 ATRIAL FIBRILLATION, UNSPECIFIED TYPE (HCC): Primary | ICD-10-CM

## 2024-11-25 LAB
INTERNATIONAL NORMALIZATION RATIO, POC: 2.6
PROTHROMBIN TIME, POC: 0

## 2024-11-25 PROCEDURE — 99211 OFF/OP EST MAY X REQ PHY/QHP: CPT | Performed by: PHYSICIAN ASSISTANT

## 2024-11-25 PROCEDURE — 85610 PROTHROMBIN TIME: CPT | Performed by: PHYSICIAN ASSISTANT

## 2024-11-25 NOTE — PROGRESS NOTES
Ms. Eva Johnson is a 74 y.o. y/o female with history of Afib Diagnosed 4/10/14. Started warfarin 4/11/15   She presents today for anticoagulation monitoring and adjustment.  Pertinent PMH:Had a CV 7/10/15 but is back in a-fib.  Patient Reported Findings:  Yes     No  [x]   []       Patient verifies current dosing regimen as listed - verified dose   []   [x]       S/S bleeding/bruising/swelling/SOB --> woke up with large bruise on right arm --> denies    []   [x]       Blood in urine or stool  []   [x]       Procedures scheduled in the future at this time --> none  []   [x]       Missed dose - Denies   []   [x]       Extra dose - Denies   []   [x]       Change in medications -  denies changes   [x]   []       Change in health/diet/appetite  usually 3-4x greens this past couple weeks has not been able to have consistent diet d/t to stress ( has cancer)---> back to normal diet she believes, no NVD---> trying to continue with greens, no NVD  --> not much vit k recently, none in past few days --> fewer greens recently, no NVD---> had more greens recently, no NVD--->  no greens --> appetite diminished recently---> still not much, less greens --> tried to eat more vit k, some vit k    []   [x]       Change in alcohol use- denies   []   [x]       Change in activity  []   [x]       Hospital admission  []   [x]       Emergency department visit-   []   [x]       Other complaints-  recently passed away     Clinical Outcomes:  Yes     No  []   [x]       Major bleeding event  []   [x]       Thromboembolic event    Duration of warfarin Therapy: indefinitely  INR Range:  2.0-3.0   RF at [facility].  Wants to discuss Eliquis with doctor in May.    INR is 2.6 today  Continue dose of 2mg on Sun, Tues and Thurs and 1mg all other days   Encouraged to maintain a consistency of vegetables/salads.  Recheck INR in 6 weeks, 1/6/2025    **Refused AVS and apt card**---> has Rigoberto    Patient consent signed 11/25/2025     Referring

## 2025-01-03 ENCOUNTER — TELEPHONE (OUTPATIENT)
Dept: PHARMACY | Age: 75
End: 2025-01-03

## 2025-01-06 ENCOUNTER — APPOINTMENT (OUTPATIENT)
Dept: PHARMACY | Age: 75
End: 2025-01-06
Payer: MEDICARE

## 2025-01-08 ENCOUNTER — ANTI-COAG VISIT (OUTPATIENT)
Dept: PHARMACY | Age: 75
End: 2025-01-08
Payer: MEDICARE

## 2025-01-08 DIAGNOSIS — I48.91 ATRIAL FIBRILLATION, UNSPECIFIED TYPE (HCC): Primary | ICD-10-CM

## 2025-01-08 LAB
INTERNATIONAL NORMALIZATION RATIO, POC: 2
PROTHROMBIN TIME, POC: 0

## 2025-01-08 PROCEDURE — 85610 PROTHROMBIN TIME: CPT | Performed by: PHYSICIAN ASSISTANT

## 2025-01-08 PROCEDURE — 99211 OFF/OP EST MAY X REQ PHY/QHP: CPT | Performed by: PHYSICIAN ASSISTANT

## 2025-02-17 ENCOUNTER — ANTI-COAG VISIT (OUTPATIENT)
Dept: PHARMACY | Age: 75
End: 2025-02-17
Payer: MEDICARE

## 2025-02-17 DIAGNOSIS — I48.0 PAROXYSMAL ATRIAL FIBRILLATION (HCC): Primary | ICD-10-CM

## 2025-02-17 LAB
INTERNATIONAL NORMALIZATION RATIO, POC: 1.6
PROTHROMBIN TIME, POC: 0

## 2025-02-17 PROCEDURE — 85610 PROTHROMBIN TIME: CPT

## 2025-02-17 PROCEDURE — 99212 OFFICE O/P EST SF 10 MIN: CPT

## 2025-02-17 NOTE — PROGRESS NOTES
Ms. Eva Johnson is a 74 y.o. y/o female with history of Afib Diagnosed 4/10/14. Started warfarin 4/11/15   She presents today for anticoagulation monitoring and adjustment.  Pertinent PMH:Had a CV 7/10/15 but is back in a-fib.  Patient Reported Findings:  Yes     No  [x]   []       Patient verifies current dosing regimen as listed - verified dose   []   [x]       S/S bleeding/bruising/swelling/SOB --> woke up with large bruise on right arm --> denies    []   [x]       Blood in urine or stool  []   [x]       Procedures scheduled in the future at this time --> none  []   [x]       Missed dose - Denies   []   [x]       Extra dose - Denies   []   [x]       Change in medications -  denies changes   [x]   []       Change in health/diet/appetite  usually 3-4x greens this past couple weeks has not been able to have consistent diet d/t to stress ( has cancer)---> back to normal diet she believes, no NVD---> trying to continue with greens, no NVD  --> not much vit k recently, none in past few days --> fewer greens recently, no NVD---> had more greens recently, no NVD--->  no greens --> appetite diminished recently---> still not much, less greens --> tried to eat more vit k, some vit k  -> eating 2-3 salads/week and lima beans and green beans ~2 times/week---> patient states maybe a few too many salads this time around   []   [x]       Change in alcohol use- denies   []   [x]       Change in activity  []   [x]       Hospital admission  []   [x]       Emergency department visit-   []   [x]       Other complaints-  recently passed away     Clinical Outcomes:  Yes     No  []   [x]       Major bleeding event  []   [x]       Thromboembolic event    Duration of warfarin Therapy: indefinitely  INR Range:  2.0-3.0   RF at [facility].  Wants to discuss Eliquis with doctor in May.    INR is 1.6 today  Take a full 2 mg tab today, then continue dose of 2mg on Sun, Tues and Thurs and 1mg all other days   Encouraged to maintain a

## 2025-02-27 ENCOUNTER — ANTI-COAG VISIT (OUTPATIENT)
Dept: PHARMACY | Age: 75
End: 2025-02-27
Payer: MEDICARE

## 2025-02-27 DIAGNOSIS — I48.91 ATRIAL FIBRILLATION, UNSPECIFIED TYPE (HCC): Primary | ICD-10-CM

## 2025-02-27 LAB
INTERNATIONAL NORMALIZATION RATIO, POC: 2.2
PROTHROMBIN TIME, POC: 0

## 2025-02-27 PROCEDURE — 85610 PROTHROMBIN TIME: CPT | Performed by: PHYSICIAN ASSISTANT

## 2025-02-27 PROCEDURE — 99211 OFF/OP EST MAY X REQ PHY/QHP: CPT | Performed by: PHYSICIAN ASSISTANT

## 2025-03-24 ENCOUNTER — ANTI-COAG VISIT (OUTPATIENT)
Dept: PHARMACY | Age: 75
End: 2025-03-24
Payer: MEDICARE

## 2025-03-24 DIAGNOSIS — I48.91 ATRIAL FIBRILLATION, UNSPECIFIED TYPE (HCC): Primary | ICD-10-CM

## 2025-03-24 LAB
INTERNATIONAL NORMALIZATION RATIO, POC: 1.8
PROTHROMBIN TIME, POC: 0

## 2025-03-24 PROCEDURE — 85610 PROTHROMBIN TIME: CPT | Performed by: PHARMACIST

## 2025-03-24 PROCEDURE — 99212 OFFICE O/P EST SF 10 MIN: CPT | Performed by: PHARMACIST

## 2025-03-24 NOTE — PROGRESS NOTES
Ms. Eva Johnson is a 75 y.o. y/o female with history of Afib Diagnosed 4/10/14. Started warfarin 4/11/15   She presents today for anticoagulation monitoring and adjustment.  Pertinent PMH:Had a CV 7/10/15 but is back in a-fib.  Patient Reported Findings:  Yes     No  [x]   []       Patient verifies current dosing regimen as listed - verified dose   []   [x]       S/S bleeding/bruising/swelling/SOB --> woke up with large bruise on right arm --> denies    []   [x]       Blood in urine or stool  []   [x]       Procedures scheduled in the future at this time --> none  []   [x]       Missed dose - Denies   []   [x]       Extra dose - Denies   []   [x]       Change in medications -  denies changes   [x]   []       Change in health/diet/appetite  usually 3-4x greens this past couple weeks has not been able to have consistent diet d/t to stress ( has cancer)---> back to normal diet she believes, no NVD---> trying to continue with greens, no NVD  --> not much vit k recently, none in past few days --> fewer greens recently, no NVD---> had more greens recently, no NVD--->  no greens --> appetite diminished recently---> still not much, less greens --> tried to eat more vit k, some vit k  -> eating 2-3 salads/week and lima beans and green beans ~2 times/week---> patient states maybe a few too many salads this time around --> lowered salad intake to a few a week --> had an extra salad this week  []   [x]       Change in alcohol use- denies   [x]   []       Change in activity--> has been walking 20-30 minutes a few times per week when weather allows  []   [x]       Hospital admission  []   [x]       Emergency department visit-   []   [x]       Other complaints-  recently passed away     Clinical Outcomes:  Yes     No  []   [x]       Major bleeding event  []   [x]       Thromboembolic event    Duration of warfarin Therapy: indefinitely  INR Range:  2.0-3.0   RF at [facility].  Wants to discuss Eliquis with doctor in

## 2025-04-07 ENCOUNTER — ANTI-COAG VISIT (OUTPATIENT)
Dept: PHARMACY | Age: 75
End: 2025-04-07
Payer: MEDICARE

## 2025-04-07 DIAGNOSIS — I48.91 ATRIAL FIBRILLATION, UNSPECIFIED TYPE (HCC): Primary | ICD-10-CM

## 2025-04-07 LAB
INTERNATIONAL NORMALIZATION RATIO, POC: 2.5
PROTHROMBIN TIME, POC: 0

## 2025-04-07 PROCEDURE — 99211 OFF/OP EST MAY X REQ PHY/QHP: CPT | Performed by: INTERNAL MEDICINE

## 2025-04-07 PROCEDURE — 85610 PROTHROMBIN TIME: CPT | Performed by: INTERNAL MEDICINE

## 2025-04-07 NOTE — PROGRESS NOTES
Ms. Eva Johnson is a 75 y.o. y/o female with history of Afib Diagnosed 4/10/14. Started warfarin 4/11/15   She presents today for anticoagulation monitoring and adjustment.  Pertinent PMH:Had a CV 7/10/15 but is back in a-fib.  Patient Reported Findings:  Yes     No  [x]   []       Patient verifies current dosing regimen as listed - verified dose   []   [x]       S/S bleeding/bruising/swelling/SOB --> woke up with large bruise on right arm --> denies    []   [x]       Blood in urine or stool  []   [x]       Procedures scheduled in the future at this time --> none  []   [x]       Missed dose - Denies   []   [x]       Extra dose - Denies   []   [x]       Change in medications -  denies changes   []   [x]       Change in health/diet/appetite  usually 3-4x greens this past couple weeks has not been able to have consistent diet d/t to stress ( has cancer)---> back to normal diet she believes, no NVD---> trying to continue with greens, no NVD  --> not much vit k recently, none in past few days --> fewer greens recently, no NVD---> had more greens recently, no NVD--->  no greens --> appetite diminished recently---> still not much, less greens --> tried to eat more vit k, some vit k  -> eating 2-3 salads/week and lima beans and green beans ~2 times/week---> patient states maybe a few too many salads this time around --> lowered salad intake to a few a week --> had an extra salad this week --> back to normal amount of vit k  []   [x]       Change in alcohol use- denies   [x]   []       Change in activity--> has been walking 20-30 minutes a few times per week when weather allows  []   [x]       Hospital admission  []   [x]       Emergency department visit-   []   [x]       Other complaints-  recently passed away     Clinical Outcomes:  Yes     No  []   [x]       Major bleeding event  []   [x]       Thromboembolic event    Duration of warfarin Therapy: indefinitely  INR Range:  2.0-3.0   RF at [facility].  Wants to

## 2025-05-19 ENCOUNTER — ANTI-COAG VISIT (OUTPATIENT)
Dept: PHARMACY | Age: 75
End: 2025-05-19
Payer: MEDICARE

## 2025-05-19 DIAGNOSIS — I48.91 ATRIAL FIBRILLATION, UNSPECIFIED TYPE (HCC): Primary | ICD-10-CM

## 2025-05-19 LAB
INTERNATIONAL NORMALIZATION RATIO, POC: 2.2
PROTHROMBIN TIME, POC: 0

## 2025-05-19 PROCEDURE — 85610 PROTHROMBIN TIME: CPT

## 2025-05-19 PROCEDURE — 99211 OFF/OP EST MAY X REQ PHY/QHP: CPT

## 2025-05-19 NOTE — PROGRESS NOTES
Ms. Eva Johnson is a 75 y.o. y/o female with history of Afib Diagnosed 4/10/14. Started warfarin 4/11/15   She presents today for anticoagulation monitoring and adjustment.  Pertinent PMH:Had a CV 7/10/15 but is back in a-fib.  Patient Reported Findings:  Yes     No  [x]   []       Patient verifies current dosing regimen as listed - verified dose   []   [x]       S/S bleeding/bruising/swelling/SOB --> woke up with large bruise on right arm --> denies    []   [x]       Blood in urine or stool- denies   []   [x]       Procedures scheduled in the future at this time --> none  []   [x]       Missed dose - Denies   []   [x]       Extra dose - Denies   []   [x]       Change in medications -  denies changes   []   [x]       Change in health/diet/appetite  usually 3-4x greens this past couple weeks has not been able to have consistent diet d/t to stress ( has cancer)---> back to normal diet she believes, no NVD---> trying to continue with greens, no NVD  --> not much vit k recently, none in past few days --> fewer greens recently, no NVD---> had more greens recently, no NVD--->  no greens --> appetite diminished recently---> still not much, less greens --> tried to eat more vit k, some vit k  -> eating 2-3 salads/week and lima beans and green beans ~2 times/week---> patient states maybe a few too many salads this time around --> lowered salad intake to a few a week --> had an extra salad this week --> back to normal amount of vit k---> cut back on salads   []   [x]       Change in alcohol use- denies   [x]   []       Change in activity--> has been walking 20-30 minutes a few times per week when weather allows  []   [x]       Hospital admission  []   [x]       Emergency department visit-   []   [x]       Other complaints-  recently passed away     Clinical Outcomes:  Yes     No  []   [x]       Major bleeding event  []   [x]       Thromboembolic event    Duration of warfarin Therapy: indefinitely  INR Range:

## 2025-06-30 ENCOUNTER — ANTI-COAG VISIT (OUTPATIENT)
Dept: PHARMACY | Age: 75
End: 2025-06-30
Payer: MEDICARE

## 2025-06-30 DIAGNOSIS — I48.91 ATRIAL FIBRILLATION, UNSPECIFIED TYPE (HCC): Primary | ICD-10-CM

## 2025-06-30 LAB
INTERNATIONAL NORMALIZATION RATIO, POC: 2
PROTHROMBIN TIME, POC: 0

## 2025-06-30 PROCEDURE — 99211 OFF/OP EST MAY X REQ PHY/QHP: CPT | Performed by: PHYSICIAN ASSISTANT

## 2025-06-30 PROCEDURE — 85610 PROTHROMBIN TIME: CPT | Performed by: PHYSICIAN ASSISTANT

## 2025-06-30 NOTE — PROGRESS NOTES
Ms. Eva Johnson is a 75 y.o. y/o female with history of Afib Diagnosed 4/10/14. Started warfarin 4/11/15   She presents today for anticoagulation monitoring and adjustment.  Pertinent PMH:Had a CV 7/10/15 but is back in a-fib.  Patient Reported Findings:  Yes     No  [x]   []       Patient verifies current dosing regimen as listed - verified dose   []   [x]       S/S bleeding/bruising/swelling/SOB --> woke up with large bruise on right arm --> denies    []   [x]       Blood in urine or stool- denies   []   [x]       Procedures scheduled in the future at this time --> none  []   [x]       Missed dose - Denies   []   [x]       Extra dose - Denies   []   [x]       Change in medications -  denies changes   []   [x]       Change in health/diet/appetite -eating 2-3 salads/week and lima beans and green beans ~2 times/week---> patient states maybe a few too many salads this time around --> lowered salad intake to a few a week --> had an extra salad this week --> back to normal amount of vit k---> cut back on salads --> no changes   []   [x]       Change in alcohol use- denies   []   [x]       Change in activity--> has been walking 20-30 minutes a few times per week when weather allows  []   [x]       Hospital admission  []   [x]       Emergency department visit-   []   [x]       Other complaints-  recently passed away     Clinical Outcomes:  Yes     No  []   [x]       Major bleeding event  []   [x]       Thromboembolic event    Duration of warfarin Therapy: indefinitely  INR Range:  2.0-3.0   RF at [facility].  Wants to discuss Eliquis with doctor in May.    INR is 2 today  Continue dose of 2mg on Sun, Tues and Thurs and 1mg all other days   Encouraged to maintain a consistency of vegetables/salads.  Recheck INR in 6 weeks, 8/11    **Refused AVS and apt card**---> has Rigoberto    Patient consent signed 11/25/2024     Referring is Dr. Leslie Short   INR (no units)   Date Value   09/05/2018 3.4   07/23/2018 2.5   06/11/2018

## 2025-08-11 ENCOUNTER — ANTI-COAG VISIT (OUTPATIENT)
Dept: PHARMACY | Age: 75
End: 2025-08-11
Payer: MEDICARE

## 2025-08-11 DIAGNOSIS — I48.91 ATRIAL FIBRILLATION, UNSPECIFIED TYPE (HCC): Primary | ICD-10-CM

## 2025-08-11 LAB
INTERNATIONAL NORMALIZATION RATIO, POC: 1.8
PROTHROMBIN TIME, POC: NORMAL

## 2025-08-11 PROCEDURE — 85610 PROTHROMBIN TIME: CPT | Performed by: PHYSICIAN ASSISTANT

## 2025-08-11 PROCEDURE — 99212 OFFICE O/P EST SF 10 MIN: CPT | Performed by: PHYSICIAN ASSISTANT
